# Patient Record
Sex: FEMALE | Race: WHITE | URBAN - METROPOLITAN AREA
[De-identification: names, ages, dates, MRNs, and addresses within clinical notes are randomized per-mention and may not be internally consistent; named-entity substitution may affect disease eponyms.]

---

## 2018-05-01 ENCOUNTER — OUTPATIENT (OUTPATIENT)
Dept: OUTPATIENT SERVICES | Facility: HOSPITAL | Age: 58
LOS: 1 days | End: 2018-05-01
Payer: MEDICAID

## 2018-05-01 PROCEDURE — G9001: CPT

## 2018-05-02 ENCOUNTER — TRANSCRIPTION ENCOUNTER (OUTPATIENT)
Age: 58
End: 2018-05-02

## 2018-05-02 PROBLEM — Z00.00 ENCOUNTER FOR PREVENTIVE HEALTH EXAMINATION: Status: ACTIVE | Noted: 2018-05-02

## 2018-05-10 ENCOUNTER — OUTPATIENT (OUTPATIENT)
Dept: OUTPATIENT SERVICES | Facility: HOSPITAL | Age: 58
LOS: 1 days | Discharge: HOME | End: 2018-05-10
Payer: MEDICAID

## 2018-05-10 DIAGNOSIS — C50.919 MALIGNANT NEOPLASM OF UNSPECIFIED SITE OF UNSPECIFIED FEMALE BREAST: ICD-10-CM

## 2018-05-14 ENCOUNTER — RESULT REVIEW (OUTPATIENT)
Age: 58
End: 2018-05-14

## 2018-05-14 ENCOUNTER — OUTPATIENT (OUTPATIENT)
Dept: OUTPATIENT SERVICES | Facility: HOSPITAL | Age: 58
LOS: 1 days | End: 2018-05-14
Payer: MEDICAID

## 2018-05-14 ENCOUNTER — APPOINTMENT (OUTPATIENT)
Dept: HEMATOLOGY ONCOLOGY | Facility: CLINIC | Age: 58
End: 2018-05-14

## 2018-05-14 VITALS
SYSTOLIC BLOOD PRESSURE: 116 MMHG | HEART RATE: 96 BPM | BODY MASS INDEX: 38.2 KG/M2 | HEIGHT: 66.46 IN | OXYGEN SATURATION: 99 % | RESPIRATION RATE: 16 BRPM | WEIGHT: 240.52 LBS | DIASTOLIC BLOOD PRESSURE: 78 MMHG | TEMPERATURE: 97.3 F

## 2018-05-14 DIAGNOSIS — Z80.3 FAMILY HISTORY OF MALIGNANT NEOPLASM OF BREAST: ICD-10-CM

## 2018-05-14 DIAGNOSIS — C50.919 MALIGNANT NEOPLASM OF UNSPECIFIED SITE OF UNSPECIFIED FEMALE BREAST: ICD-10-CM

## 2018-05-14 DIAGNOSIS — Z86.39 PERSONAL HISTORY OF OTHER ENDOCRINE, NUTRITIONAL AND METABOLIC DISEASE: ICD-10-CM

## 2018-05-14 DIAGNOSIS — Z81.1 FAMILY HISTORY OF ALCOHOL ABUSE AND DEPENDENCE: ICD-10-CM

## 2018-05-14 DIAGNOSIS — R00.0 TACHYCARDIA, UNSPECIFIED: ICD-10-CM

## 2018-05-14 DIAGNOSIS — Z80.42 FAMILY HISTORY OF MALIGNANT NEOPLASM OF PROSTATE: ICD-10-CM

## 2018-05-14 DIAGNOSIS — Z78.9 OTHER SPECIFIED HEALTH STATUS: ICD-10-CM

## 2018-05-14 LAB
HCT VFR BLD CALC: 34.2 % — LOW (ref 34.5–45)
HGB BLD-MCNC: 11.9 G/DL — SIGNIFICANT CHANGE UP (ref 11.5–15.5)
MCHC RBC-ENTMCNC: 33.5 PG — SIGNIFICANT CHANGE UP (ref 27–34)
MCHC RBC-ENTMCNC: 34.8 G/DL — SIGNIFICANT CHANGE UP (ref 32–36)
MCV RBC AUTO: 96.4 FL — SIGNIFICANT CHANGE UP (ref 80–100)
PLATELET # BLD AUTO: 150 K/UL — SIGNIFICANT CHANGE UP (ref 150–400)
RBC # BLD: 3.55 M/UL — LOW (ref 3.8–5.2)
RBC # FLD: 13.3 % — SIGNIFICANT CHANGE UP (ref 10.3–14.5)
WBC # BLD: 6.9 K/UL — SIGNIFICANT CHANGE UP (ref 3.8–10.5)
WBC # FLD AUTO: 6.9 K/UL — SIGNIFICANT CHANGE UP (ref 3.8–10.5)

## 2018-05-14 PROCEDURE — 86900 BLOOD TYPING SEROLOGIC ABO: CPT

## 2018-05-14 PROCEDURE — 86901 BLOOD TYPING SEROLOGIC RH(D): CPT

## 2018-05-14 PROCEDURE — 86850 RBC ANTIBODY SCREEN: CPT

## 2018-05-14 RX ORDER — LETROZOLE TABLETS 2.5 MG/1
2.5 TABLET, FILM COATED ORAL
Refills: 0 | Status: ACTIVE | COMMUNITY

## 2018-05-14 RX ORDER — OMEPRAZOLE MAGNESIUM 20 MG/1
20 TABLET, DELAYED RELEASE ORAL
Refills: 0 | Status: ACTIVE | COMMUNITY

## 2018-05-15 ENCOUNTER — TRANSCRIPTION ENCOUNTER (OUTPATIENT)
Age: 58
End: 2018-05-15

## 2018-05-15 ENCOUNTER — RESULT REVIEW (OUTPATIENT)
Age: 58
End: 2018-05-15

## 2018-05-15 PROCEDURE — 88321 CONSLTJ&REPRT SLD PREP ELSWR: CPT

## 2018-05-17 ENCOUNTER — FORM ENCOUNTER (OUTPATIENT)
Age: 58
End: 2018-05-17

## 2018-05-18 ENCOUNTER — OUTPATIENT (OUTPATIENT)
Dept: OUTPATIENT SERVICES | Facility: HOSPITAL | Age: 58
LOS: 1 days | End: 2018-05-18
Payer: MEDICAID

## 2018-05-18 ENCOUNTER — APPOINTMENT (OUTPATIENT)
Dept: CT IMAGING | Facility: CLINIC | Age: 58
End: 2018-05-18
Payer: MEDICAID

## 2018-05-18 DIAGNOSIS — Z00.8 ENCOUNTER FOR OTHER GENERAL EXAMINATION: ICD-10-CM

## 2018-05-18 PROCEDURE — 71250 CT THORAX DX C-: CPT | Mod: 26

## 2018-05-18 PROCEDURE — 74177 CT ABD & PELVIS W/CONTRAST: CPT | Mod: 26

## 2018-05-18 PROCEDURE — 74177 CT ABD & PELVIS W/CONTRAST: CPT

## 2018-05-18 PROCEDURE — 71250 CT THORAX DX C-: CPT

## 2018-05-21 PROBLEM — R00.0 TACHYCARDIA: Status: ACTIVE | Noted: 2018-05-21

## 2018-05-21 RX ORDER — METOPROLOL TARTRATE 50 MG/1
50 TABLET, FILM COATED ORAL DAILY
Qty: 30 | Refills: 0 | Status: ACTIVE | COMMUNITY
Start: 2018-05-21 | End: 1900-01-01

## 2018-05-21 RX ORDER — LETROZOLE TABLETS 2.5 MG/1
2.5 TABLET, FILM COATED ORAL DAILY
Qty: 30 | Refills: 5 | Status: ACTIVE | COMMUNITY
Start: 2018-05-21 | End: 1900-01-01

## 2018-05-23 ENCOUNTER — FORM ENCOUNTER (OUTPATIENT)
Age: 58
End: 2018-05-23

## 2018-05-24 ENCOUNTER — APPOINTMENT (OUTPATIENT)
Dept: NUCLEAR MEDICINE | Facility: IMAGING CENTER | Age: 58
End: 2018-05-24
Payer: MEDICAID

## 2018-05-24 ENCOUNTER — INPATIENT (INPATIENT)
Facility: HOSPITAL | Age: 58
LOS: 7 days | Discharge: HOSPICE | End: 2018-06-01
Attending: HOSPITALIST | Admitting: HOSPITALIST
Payer: MEDICAID

## 2018-05-24 ENCOUNTER — OUTPATIENT (OUTPATIENT)
Dept: OUTPATIENT SERVICES | Facility: HOSPITAL | Age: 58
LOS: 1 days | End: 2018-05-24
Payer: MEDICAID

## 2018-05-24 VITALS
OXYGEN SATURATION: 100 % | SYSTOLIC BLOOD PRESSURE: 145 MMHG | HEART RATE: 81 BPM | WEIGHT: 240.08 LBS | TEMPERATURE: 98 F | RESPIRATION RATE: 16 BRPM | HEIGHT: 66 IN | DIASTOLIC BLOOD PRESSURE: 64 MMHG

## 2018-05-24 DIAGNOSIS — R62.7 ADULT FAILURE TO THRIVE: ICD-10-CM

## 2018-05-24 DIAGNOSIS — C50.919 MALIGNANT NEOPLASM OF UNSPECIFIED SITE OF UNSPECIFIED FEMALE BREAST: ICD-10-CM

## 2018-05-24 LAB
ALBUMIN SERPL ELPH-MCNC: 2.8 G/DL — LOW (ref 3.3–5)
ALP SERPL-CCNC: 651 U/L — HIGH (ref 40–120)
ALT FLD-CCNC: 112 U/L — HIGH (ref 4–33)
APPEARANCE UR: SIGNIFICANT CHANGE UP
AST SERPL-CCNC: 394 U/L — HIGH (ref 4–32)
BASE EXCESS BLDV CALC-SCNC: -1.8 MMOL/L — SIGNIFICANT CHANGE UP
BASOPHILS # BLD AUTO: 0.05 K/UL — SIGNIFICANT CHANGE UP (ref 0–0.2)
BASOPHILS NFR BLD AUTO: 0.6 % — SIGNIFICANT CHANGE UP (ref 0–2)
BILIRUB SERPL-MCNC: 6.7 MG/DL — HIGH (ref 0.2–1.2)
BILIRUB UR-MCNC: HIGH
BLOOD GAS VENOUS - CREATININE: 0.99 MG/DL — SIGNIFICANT CHANGE UP (ref 0.5–1.3)
BLOOD UR QL VISUAL: NEGATIVE — SIGNIFICANT CHANGE UP
BUN SERPL-MCNC: 18 MG/DL — SIGNIFICANT CHANGE UP (ref 7–23)
CALCIUM SERPL-MCNC: 11.9 MG/DL — HIGH (ref 8.4–10.5)
CHLORIDE BLDV-SCNC: 106 MMOL/L — SIGNIFICANT CHANGE UP (ref 96–108)
CHLORIDE SERPL-SCNC: 98 MMOL/L — SIGNIFICANT CHANGE UP (ref 98–107)
CO2 SERPL-SCNC: 21 MMOL/L — LOW (ref 22–31)
COD CRY URNS QL: SIGNIFICANT CHANGE UP (ref 0–0)
COLOR SPEC: HIGH
CREAT SERPL-MCNC: 1 MG/DL — SIGNIFICANT CHANGE UP (ref 0.5–1.3)
EOSINOPHIL # BLD AUTO: 0.11 K/UL — SIGNIFICANT CHANGE UP (ref 0–0.5)
EOSINOPHIL NFR BLD AUTO: 1.3 % — SIGNIFICANT CHANGE UP (ref 0–6)
GAS PNL BLDV: 133 MMOL/L — LOW (ref 136–146)
GLUCOSE BLDV-MCNC: 110 — HIGH (ref 70–99)
GLUCOSE SERPL-MCNC: 113 MG/DL — HIGH (ref 70–99)
GLUCOSE UR-MCNC: NEGATIVE — SIGNIFICANT CHANGE UP
HCO3 BLDV-SCNC: 23 MMOL/L — SIGNIFICANT CHANGE UP (ref 20–27)
HCT VFR BLD CALC: 37.7 % — SIGNIFICANT CHANGE UP (ref 34.5–45)
HCT VFR BLDV CALC: 38.7 % — SIGNIFICANT CHANGE UP (ref 34.5–45)
HGB BLD-MCNC: 12.1 G/DL — SIGNIFICANT CHANGE UP (ref 11.5–15.5)
HGB BLDV-MCNC: 12.6 G/DL — SIGNIFICANT CHANGE UP (ref 11.5–15.5)
IMM GRANULOCYTES # BLD AUTO: 0.17 # — SIGNIFICANT CHANGE UP
IMM GRANULOCYTES NFR BLD AUTO: 2 % — HIGH (ref 0–1.5)
KETONES UR-MCNC: SIGNIFICANT CHANGE UP
LACTATE BLDV-MCNC: 4.3 MMOL/L — CRITICAL HIGH (ref 0.5–2)
LEUKOCYTE ESTERASE UR-ACNC: HIGH
LYMPHOCYTES # BLD AUTO: 2.1 K/UL — SIGNIFICANT CHANGE UP (ref 1–3.3)
LYMPHOCYTES # BLD AUTO: 24.6 % — SIGNIFICANT CHANGE UP (ref 13–44)
MCHC RBC-ENTMCNC: 30.9 PG — SIGNIFICANT CHANGE UP (ref 27–34)
MCHC RBC-ENTMCNC: 32.1 % — SIGNIFICANT CHANGE UP (ref 32–36)
MCV RBC AUTO: 96.4 FL — SIGNIFICANT CHANGE UP (ref 80–100)
MONOCYTES # BLD AUTO: 0.69 K/UL — SIGNIFICANT CHANGE UP (ref 0–0.9)
MONOCYTES NFR BLD AUTO: 8.1 % — SIGNIFICANT CHANGE UP (ref 2–14)
MUCOUS THREADS # UR AUTO: SIGNIFICANT CHANGE UP
NEUTROPHILS # BLD AUTO: 5.41 K/UL — SIGNIFICANT CHANGE UP (ref 1.8–7.4)
NEUTROPHILS NFR BLD AUTO: 63.4 % — SIGNIFICANT CHANGE UP (ref 43–77)
NITRITE UR-MCNC: NEGATIVE — SIGNIFICANT CHANGE UP
NON-SQ EPI CELLS # UR AUTO: 1 — SIGNIFICANT CHANGE UP
NRBC # FLD: 0.15 — SIGNIFICANT CHANGE UP
NRBC FLD-RTO: 1.8 — SIGNIFICANT CHANGE UP
PCO2 BLDV: 34 MMHG — LOW (ref 41–51)
PH BLDV: 7.42 PH — SIGNIFICANT CHANGE UP (ref 7.32–7.43)
PH UR: 6 — SIGNIFICANT CHANGE UP (ref 4.6–8)
PLATELET # BLD AUTO: 156 K/UL — SIGNIFICANT CHANGE UP (ref 150–400)
PMV BLD: 9.2 FL — SIGNIFICANT CHANGE UP (ref 7–13)
PO2 BLDV: 62 MMHG — HIGH (ref 35–40)
POTASSIUM BLDV-SCNC: 4.6 MMOL/L — HIGH (ref 3.4–4.5)
POTASSIUM SERPL-MCNC: 4.5 MMOL/L — SIGNIFICANT CHANGE UP (ref 3.5–5.3)
POTASSIUM SERPL-SCNC: 4.5 MMOL/L — SIGNIFICANT CHANGE UP (ref 3.5–5.3)
PROT SERPL-MCNC: 6.3 G/DL — SIGNIFICANT CHANGE UP (ref 6–8.3)
PROT UR-MCNC: 30 MG/DL — HIGH
RBC # BLD: 3.91 M/UL — SIGNIFICANT CHANGE UP (ref 3.8–5.2)
RBC # FLD: 16.2 % — HIGH (ref 10.3–14.5)
RBC CASTS # UR COMP ASSIST: SIGNIFICANT CHANGE UP (ref 0–?)
SAO2 % BLDV: 91.7 % — HIGH (ref 60–85)
SODIUM SERPL-SCNC: 135 MMOL/L — SIGNIFICANT CHANGE UP (ref 135–145)
SP GR SPEC: 1.03 — SIGNIFICANT CHANGE UP (ref 1–1.04)
SQUAMOUS # UR AUTO: SIGNIFICANT CHANGE UP
UROBILINOGEN FLD QL: 4 MG/DL — HIGH
WBC # BLD: 8.53 K/UL — SIGNIFICANT CHANGE UP (ref 3.8–10.5)
WBC # FLD AUTO: 8.53 K/UL — SIGNIFICANT CHANGE UP (ref 3.8–10.5)
WBC CLUMPS #/AREA URNS HPF: PRESENT — HIGH (ref 0–?)
WBC UR QL: SIGNIFICANT CHANGE UP (ref 0–?)

## 2018-05-24 PROCEDURE — 78306 BONE IMAGING WHOLE BODY: CPT | Mod: 26

## 2018-05-24 PROCEDURE — A9561: CPT

## 2018-05-24 PROCEDURE — 99223 1ST HOSP IP/OBS HIGH 75: CPT

## 2018-05-24 PROCEDURE — 78306 BONE IMAGING WHOLE BODY: CPT

## 2018-05-24 RX ORDER — SODIUM CHLORIDE 9 MG/ML
1000 INJECTION, SOLUTION INTRAVENOUS ONCE
Qty: 0 | Refills: 0 | Status: COMPLETED | OUTPATIENT
Start: 2018-05-24 | End: 2018-05-24

## 2018-05-24 RX ADMIN — SODIUM CHLORIDE 1000 MILLILITER(S): 9 INJECTION, SOLUTION INTRAVENOUS at 19:28

## 2018-05-24 RX ADMIN — SODIUM CHLORIDE 1000 MILLILITER(S): 9 INJECTION, SOLUTION INTRAVENOUS at 18:20

## 2018-05-24 NOTE — ED PROVIDER NOTE - ATTENDING CONTRIBUTION TO CARE
agree with resident note  56 yo female with PMH of metastatic breast cancer with mets to bones and now new findings on CT earlier today of mets to the liver.  Pt states sent by her oncologist for further workup.  Minimal PO tolerance over the last few weeks.  Denies fever    PE:  well appearing; MMM; CTAB/L; s1 s2 no m/r/g abd soft/NT/ND ext: no edema    admit for hydration and further workup biopsy, onc recs

## 2018-05-24 NOTE — H&P ADULT - PROBLEM SELECTOR PLAN 3
likely 2/2 dehydration, lower suspicion of metformin as a cause  - continue IVF hydration and recheck lactate

## 2018-05-24 NOTE — ED PROVIDER NOTE - PROGRESS NOTE DETAILS
Spoke with Dr. Campos. Oncology recs: admit for further workup including abdomen ultrasound and MRI, GI consult, liver biopsy with IR

## 2018-05-24 NOTE — H&P ADULT - NSHPPHYSICALEXAM_GEN_ALL_CORE
Vital Signs Last 24 Hrs  T(C): 36.7 (24 May 2018 22:50), Max: 37.1 (24 May 2018 19:35)  T(F): 98 (24 May 2018 22:50), Max: 98.7 (24 May 2018 19:35)  HR: 79 (24 May 2018 22:50) (77 - 81)  BP: 128/70 (24 May 2018 22:50) (128/70 - 145/64)  BP(mean): --  RR: 18 (24 May 2018 22:50) (16 - 18)  SpO2: 100% (24 May 2018 22:50) (98% - 100%)    PHYSICAL EXAM:  GENERAL: No Acute Distress, tired appearing  EYES: conjunctiva and sclera clear  ENMT: dry mucous membranes   NECK: Supple  CHEST/LUNG: Clear to auscultation bilaterally  HEART: Regular rate and rhythm; No murmurs, rubs, or gallops  ABDOMEN: Soft, Nontender, Nondistended; Bowel sounds normal  EXTREMITIES:   No clubbing, cyanosis, or pedal edema  PSYCH: Normal Affect, Normal Behavior  NEUROLOGY:   - Mental status A&O x 3,  SKIN: No rashes or lesions, dry  MUSCULOSKELETAL: No joint swelling

## 2018-05-24 NOTE — PATIENT PROFILE ADULT. - TRANSFUSION HX COMMENT, PROFILE
first ever blood transfusion pt states she had extreme hypertension, the second time it was elevated but not so drastically

## 2018-05-24 NOTE — H&P ADULT - HISTORY OF PRESENT ILLNESS
58 y/o F with DM, HTN, and breast ca (ER/SC + [id number] -) on letrozole with bone and liver mets s/p RT to hip 2016 p/w nausea, vomiting and weakness.  Pt recently moved from [place] where she had been established with an oncologist.  She was previously treated with Xgeva, and more recently with Ibrance which she stopped 2/2 adverse effects.  Pt established care at [facility] last week, and had imaging with bone scan and CT c/a/p which showed diffuse bone mets, and new diffuse liver mets.  Pt reports feeling nauseated with frequent vomiting for the past month, which has worsened over the past 2 weeks.  She has had minimal appetite.  She has been having decreased UOP with small amount of dark urine described as the color of "dark honey."  She went to urgent care 3 weeks ago 2/2 concern for UTI, but was told UA was negative.  She reports no fever or dysuria.  She has felt very weak.      2/2 pt's symptoms, imaging findings, and labs showing transaminitis with elevated INR, pt was told to come to the ED for further management.      In the ED, pt was given 2L of LR.

## 2018-05-24 NOTE — ED PROVIDER NOTE - CARE PLAN
Principal Discharge DX:	Failure to thrive in adult  Secondary Diagnosis:	Breast cancer  Secondary Diagnosis:	Cirrhosis

## 2018-05-24 NOTE — H&P ADULT - ASSESSMENT
56 y/o F with breast ca with bone and recently found liver mets, HTN, and DM p/w weakness and n/v with recent imaging showing new hepatic mets and possible cirrhosis.

## 2018-05-24 NOTE — ED ADULT TRIAGE NOTE - CHIEF COMPLAINT QUOTE
Pt c/o of constant nausea with vomiting since April pt was sent in by her PMD for further evaluation.  Pt has hx of breast cancer with mets to the bone on hormonal treatment for the cancer. Pt appears very pale in triage. Pt c/o of constant nausea with vomiting since April pt was sent in by her PMD for further evaluation.  Pt has hx of breast cancer with mets to the bone on hormonal treatment for the cancer. Pt appears very pale in triage.  on call oncologist  Dr. Campos [phone]

## 2018-05-24 NOTE — H&P ADULT - PROBLEM SELECTOR PLAN 7
IMPROVE VTE Individual Risk Assessment          RISK                                                          Points  [  ] Previous VTE                                                3  [  ] Thrombophilia                                             2  [  ] Lower limb paralysis                                   2        (unable to hold up >15 seconds)    [ x ] Current Cancer                                             2         (within 6 months)  [  ] Immobilization > 24 hrs                              1  [  ] ICU/CCU stay > 24 hours                             1  [  ] Age > 60                                                         1    IMPROVE VTE Score: 2    Will keep on SCD's for now given elevated INR Will keep on SCD's for now given elevated INR and unknown if brain mets present.    IMPROVE VTE Individual Risk Assessment          RISK                                                          Points  [  ] Previous VTE                                                3  [  ] Thrombophilia                                             2  [  ] Lower limb paralysis                                   2        (unable to hold up >15 seconds)    [ x ] Current Cancer                                             2         (within 6 months)  [  ] Immobilization > 24 hrs                              1  [  ] ICU/CCU stay > 24 hours                             1  [  ] Age > 60                                                         1    IMPROVE VTE Score: 2

## 2018-05-24 NOTE — H&P ADULT - NSHPREVIEWOFSYSTEMS_GEN_ALL_CORE
REVIEW OF SYSTEMS    General:  Generalized weakness  Skin/Breast:  Negative  Ophthalmologic:  Negative  ENMT:  Negative  Respiratory and Thorax:  Negative  Cardiovascular:  Negative  Gastrointestinal:  Nausea, vomiting.  No diarrhea or constipation  Genitourinary:  Dark urine as above  Musculoskeletal:  Negative  Neurological:  Negative  Psychiatric:  Negative  Hematology/Lymphatics:  Negative	  Endocrine:	  Negative  Allergic/Immunologic:	 Negative

## 2018-05-24 NOTE — ED ADULT NURSE NOTE - OBJECTIVE STATEMENT
Pt awake and alert co weakness and nausea. Pt looks pale in , denies pain at this time.  iv placed . Pts respirations non labored 02 sat 99% room air pt denies sob. Pt now awaiting md evaluation. Pt given call bell to ask for assistance, pts daughter at bedside.

## 2018-05-24 NOTE — PATIENT PROFILE ADULT. - MEDICATION ADMINISTRATION INFO, PROFILE
recently started having trouble having pills, smaller pills work better, pt states she vomits when she has trouble swallowing the pills/cut pills in half/difficulty swallowing pills

## 2018-05-24 NOTE — ED ADULT NURSE NOTE - CHIEF COMPLAINT QUOTE
Pt c/o of constant nausea with vomiting since April pt was sent in by her PMD for further evaluation.  Pt has hx of breast cancer with mets to the bone on hormonal treatment for the cancer. Pt appears very pale in triage.

## 2018-05-24 NOTE — H&P ADULT - NSHPLABSRESULTS_GEN_ALL_CORE
135  |  98  |  18  ----------------------------<  113<H>  4.5   |  21<L>  |  1.00    Ca    11.9<H>      24 May 2018 17:56    TPro  6.3  /  Alb  2.8<L>  /  TBili  6.7<H>  /  DBili  x   /  AST  394<H>  /  ALT  112<H>  /  AlkPhos  651<H>                              12.1   8.53  )-----------( 156      ( 24 May 2018 17:56 )             37.7                 Urinalysis Basic - ( 24 May 2018 23:22 )    Color: BROWN / Appearance: HAZY / S.029 / pH: 6.0  Gluc: NEGATIVE / Ketone: TRACE  / Bili: MODERATE / Urobili: 4 mg/dL   Blood: NEGATIVE / Protein: 30 mg/dL / Nitrite: NEGATIVE   Leuk Esterase: MODERATE / RBC: 2-5 / WBC 25-50   Sq Epi: OCC / Non Sq Epi: x / Bacteria: x    CT a/p reviewed in PACS: diffuse osseous and hepatic mets and possible cirrhosis      < from: [facility] Total (18 @ 16:37) >    Findings compatible with diffuse osseous metastases in the axial and   appendicular skeleton.    < end of copied text >

## 2018-05-24 NOTE — H&P ADULT - PROBLEM SELECTOR PLAN 1
- oncology outpatient notes reviewed  - will call IR in AM for possible biopsy to confirm breast primary

## 2018-05-24 NOTE — ED PROVIDER NOTE - OBJECTIVE STATEMENT
58 yo female with PMH of metastatic breast cancer with mets to bones and now new findings on CT earlier today of mets to the liver. Pt notes she has had chronic nausea and vomiting daily for at least 1-2 months. Sent in by her [facility] oncology team today for further evaluation. Pt is currently on hormonal treatment for her cancer. Pale appearing.   Patient's on call oncologist  Dr. Campos [phone]

## 2018-05-25 DIAGNOSIS — E11.9 TYPE 2 DIABETES MELLITUS WITHOUT COMPLICATIONS: ICD-10-CM

## 2018-05-25 DIAGNOSIS — C50.919 MALIGNANT NEOPLASM OF UNSPECIFIED SITE OF UNSPECIFIED FEMALE BREAST: ICD-10-CM

## 2018-05-25 DIAGNOSIS — Z29.9 ENCOUNTER FOR PROPHYLACTIC MEASURES, UNSPECIFIED: ICD-10-CM

## 2018-05-25 DIAGNOSIS — E83.52 HYPERCALCEMIA: ICD-10-CM

## 2018-05-25 DIAGNOSIS — K76.89 OTHER SPECIFIED DISEASES OF LIVER: ICD-10-CM

## 2018-05-25 DIAGNOSIS — E87.2 ACIDOSIS: ICD-10-CM

## 2018-05-25 DIAGNOSIS — I10 ESSENTIAL (PRIMARY) HYPERTENSION: ICD-10-CM

## 2018-05-25 DIAGNOSIS — C78.7 SECONDARY MALIGNANT NEOPLASM OF LIVER AND INTRAHEPATIC BILE DUCT: ICD-10-CM

## 2018-05-25 DIAGNOSIS — K72.90 HEPATIC FAILURE, UNSPECIFIED WITHOUT COMA: ICD-10-CM

## 2018-05-25 LAB
ALBUMIN SERPL ELPH-MCNC: 2.5 G/DL — LOW (ref 3.3–5)
ALP SERPL-CCNC: 554 U/L — HIGH (ref 40–120)
ALT FLD-CCNC: 102 U/L — HIGH (ref 4–33)
AST SERPL-CCNC: 349 U/L — HIGH (ref 4–32)
BILIRUB DIRECT SERPL-MCNC: 4.7 MG/DL — HIGH (ref 0.1–0.2)
BILIRUB SERPL-MCNC: 6.4 MG/DL — HIGH (ref 0.2–1.2)
BUN SERPL-MCNC: 18 MG/DL — SIGNIFICANT CHANGE UP (ref 7–23)
CALCIUM SERPL-MCNC: 11.1 MG/DL — HIGH (ref 8.4–10.5)
CHLORIDE SERPL-SCNC: 99 MMOL/L — SIGNIFICANT CHANGE UP (ref 98–107)
CO2 SERPL-SCNC: 23 MMOL/L — SIGNIFICANT CHANGE UP (ref 22–31)
CREAT SERPL-MCNC: 0.96 MG/DL — SIGNIFICANT CHANGE UP (ref 0.5–1.3)
GLUCOSE SERPL-MCNC: 85 MG/DL — SIGNIFICANT CHANGE UP (ref 70–99)
HCT VFR BLD CALC: 32.1 % — LOW (ref 34.5–45)
HGB BLD-MCNC: 10.4 G/DL — LOW (ref 11.5–15.5)
INR BLD: 1.47 — HIGH (ref 0.88–1.17)
LACTATE SERPL-SCNC: 2.7 MMOL/L — HIGH (ref 0.5–2)
MAGNESIUM SERPL-MCNC: 1.6 MG/DL — SIGNIFICANT CHANGE UP (ref 1.6–2.6)
MCHC RBC-ENTMCNC: 31.4 PG — SIGNIFICANT CHANGE UP (ref 27–34)
MCHC RBC-ENTMCNC: 32.4 % — SIGNIFICANT CHANGE UP (ref 32–36)
MCV RBC AUTO: 97 FL — SIGNIFICANT CHANGE UP (ref 80–100)
NRBC # FLD: 0.09 — SIGNIFICANT CHANGE UP
NRBC FLD-RTO: 1.2 — SIGNIFICANT CHANGE UP
PLATELET # BLD AUTO: 118 K/UL — LOW (ref 150–400)
PMV BLD: 9.4 FL — SIGNIFICANT CHANGE UP (ref 7–13)
POTASSIUM SERPL-MCNC: 4.6 MMOL/L — SIGNIFICANT CHANGE UP (ref 3.5–5.3)
POTASSIUM SERPL-SCNC: 4.6 MMOL/L — SIGNIFICANT CHANGE UP (ref 3.5–5.3)
PROT SERPL-MCNC: 5.5 G/DL — LOW (ref 6–8.3)
PROTHROM AB SERPL-ACNC: 16.4 SEC — HIGH (ref 9.8–13.1)
RBC # BLD: 3.31 M/UL — LOW (ref 3.8–5.2)
RBC # FLD: 16.9 % — HIGH (ref 10.3–14.5)
SODIUM SERPL-SCNC: 135 MMOL/L — SIGNIFICANT CHANGE UP (ref 135–145)
WBC # BLD: 7.8 K/UL — SIGNIFICANT CHANGE UP (ref 3.8–10.5)
WBC # FLD AUTO: 7.8 K/UL — SIGNIFICANT CHANGE UP (ref 3.8–10.5)

## 2018-05-25 PROCEDURE — 99222 1ST HOSP IP/OBS MODERATE 55: CPT | Mod: GC

## 2018-05-25 PROCEDURE — 99233 SBSQ HOSP IP/OBS HIGH 50: CPT

## 2018-05-25 RX ORDER — SODIUM CHLORIDE 9 MG/ML
1000 INJECTION INTRAMUSCULAR; INTRAVENOUS; SUBCUTANEOUS
Qty: 0 | Refills: 0 | Status: DISCONTINUED | OUTPATIENT
Start: 2018-05-25 | End: 2018-05-26

## 2018-05-25 RX ORDER — SODIUM CHLORIDE 9 MG/ML
1000 INJECTION, SOLUTION INTRAVENOUS
Qty: 0 | Refills: 0 | Status: DISCONTINUED | OUTPATIENT
Start: 2018-05-25 | End: 2018-06-01

## 2018-05-25 RX ORDER — INSULIN LISPRO 100/ML
VIAL (ML) SUBCUTANEOUS
Qty: 0 | Refills: 0 | Status: DISCONTINUED | OUTPATIENT
Start: 2018-05-25 | End: 2018-06-01

## 2018-05-25 RX ORDER — FLUTICASONE PROPIONATE 50 MCG
1 SPRAY, SUSPENSION NASAL
Qty: 0 | Refills: 0 | Status: DISCONTINUED | OUTPATIENT
Start: 2018-05-25 | End: 2018-06-01

## 2018-05-25 RX ORDER — CALCITONIN SALMON 200 [IU]/ML
400 INJECTION, SOLUTION INTRAMUSCULAR EVERY 12 HOURS
Qty: 0 | Refills: 0 | Status: COMPLETED | OUTPATIENT
Start: 2018-05-25 | End: 2018-05-26

## 2018-05-25 RX ORDER — DEXTROSE 50 % IN WATER 50 %
15 SYRINGE (ML) INTRAVENOUS ONCE
Qty: 0 | Refills: 0 | Status: DISCONTINUED | OUTPATIENT
Start: 2018-05-25 | End: 2018-06-01

## 2018-05-25 RX ORDER — ONDANSETRON 8 MG/1
4 TABLET, FILM COATED ORAL EVERY 8 HOURS
Qty: 0 | Refills: 0 | Status: DISCONTINUED | OUTPATIENT
Start: 2018-05-25 | End: 2018-05-30

## 2018-05-25 RX ORDER — DEXTROSE 50 % IN WATER 50 %
25 SYRINGE (ML) INTRAVENOUS ONCE
Qty: 0 | Refills: 0 | Status: DISCONTINUED | OUTPATIENT
Start: 2018-05-25 | End: 2018-06-01

## 2018-05-25 RX ORDER — METOPROLOL TARTRATE 50 MG
50 TABLET ORAL
Qty: 0 | Refills: 0 | Status: DISCONTINUED | OUTPATIENT
Start: 2018-05-25 | End: 2018-06-01

## 2018-05-25 RX ORDER — INSULIN LISPRO 100/ML
VIAL (ML) SUBCUTANEOUS AT BEDTIME
Qty: 0 | Refills: 0 | Status: DISCONTINUED | OUTPATIENT
Start: 2018-05-25 | End: 2018-06-01

## 2018-05-25 RX ORDER — ONDANSETRON 8 MG/1
4 TABLET, FILM COATED ORAL ONCE
Qty: 0 | Refills: 0 | Status: DISCONTINUED | OUTPATIENT
Start: 2018-05-25 | End: 2018-05-30

## 2018-05-25 RX ORDER — GLUCAGON INJECTION, SOLUTION 0.5 MG/.1ML
1 INJECTION, SOLUTION SUBCUTANEOUS ONCE
Qty: 0 | Refills: 0 | Status: DISCONTINUED | OUTPATIENT
Start: 2018-05-25 | End: 2018-06-01

## 2018-05-25 RX ORDER — SODIUM CHLORIDE 9 MG/ML
1000 INJECTION INTRAMUSCULAR; INTRAVENOUS; SUBCUTANEOUS
Qty: 0 | Refills: 0 | Status: DISCONTINUED | OUTPATIENT
Start: 2018-05-25 | End: 2018-05-25

## 2018-05-25 RX ORDER — PAMIDRONATE DISODIUM 9 MG/ML
90 INJECTION, SOLUTION INTRAVENOUS ONCE
Qty: 0 | Refills: 0 | Status: COMPLETED | OUTPATIENT
Start: 2018-05-25 | End: 2018-05-25

## 2018-05-25 RX ORDER — DEXTROSE 50 % IN WATER 50 %
12.5 SYRINGE (ML) INTRAVENOUS ONCE
Qty: 0 | Refills: 0 | Status: DISCONTINUED | OUTPATIENT
Start: 2018-05-25 | End: 2018-06-01

## 2018-05-25 RX ADMIN — CALCITONIN SALMON 400 INTERNATIONAL UNIT(S): 200 INJECTION, SOLUTION INTRAMUSCULAR at 18:30

## 2018-05-25 RX ADMIN — Medication 1 SPRAY(S): at 18:24

## 2018-05-25 RX ADMIN — Medication 50 MILLIGRAM(S): at 18:25

## 2018-05-25 RX ADMIN — ONDANSETRON 4 MILLIGRAM(S): 8 TABLET, FILM COATED ORAL at 18:59

## 2018-05-25 RX ADMIN — Medication 50 MILLIGRAM(S): at 06:29

## 2018-05-25 RX ADMIN — SODIUM CHLORIDE 125 MILLILITER(S): 9 INJECTION INTRAMUSCULAR; INTRAVENOUS; SUBCUTANEOUS at 10:33

## 2018-05-25 RX ADMIN — PAMIDRONATE DISODIUM 65 MILLIGRAM(S): 9 INJECTION, SOLUTION INTRAVENOUS at 18:25

## 2018-05-25 RX ADMIN — SODIUM CHLORIDE 100 MILLILITER(S): 9 INJECTION INTRAMUSCULAR; INTRAVENOUS; SUBCUTANEOUS at 01:17

## 2018-05-25 NOTE — CONSULT NOTE ADULT - SUBJECTIVE AND OBJECTIVE BOX
HEPATOLOGY       Chief Complaint:  Patient is a 57y old  Female who presents with a chief complaint of elevated liver enzymes, weakness, vomiting (24 May 2018 23:54)      HPI:  56 y/o F with DM, HTN, and breast ca (ER/IN + [id number] -) on letrozole with bone and liver mets s/p RT to hip 2016 p/w nausea, vomiting and weakness.  Pt recently moved from [place] where she had been established with an oncologist.  She was previously treated with Xgeva, and more recently with Ibrance which she stopped 2/2 adverse effects.  Pt established care at [facility] last week, and had imaging with bone scan and CT c/a/p which showed diffuse bone mets, and new diffuse liver mets.  Pt reports feeling nauseated with frequent vomiting for the past month, which has worsened over the past 2 weeks.  She has had minimal appetite.  She has been having decreased UOP with small amount of dark urine described as the color of "dark honey."  She went to urgent care 3 weeks ago 2/2 concern for UTI, but was told UA was negative.  She reports no fever or dysuria.  She has felt very weak.      2/2 pt's symptoms, imaging findings, and labs showing transaminitis with elevated INR, pt was told to come to the ED for further management.      In the ED, pt was given 2L of LR. (24 May 2018 23:54)      Allergies:  latex (Other (Skin))  No Known Allergies      Home Medications:  letrozole 2.5 mg oral tablet: 1 tab(s) orally once a day (25 May 2018 00:39)  metFORMIN:  (25 May 2018 00:39)  metoprolol tartrate 50 mg oral tablet: 1 tab(s) orally 2 times a day (25 May 2018 00:39)      Hospital Medications:  metoprolol tartrate 50 milliGRAM(s) Oral two times a day  sodium chloride 0.9%. 1000 milliLiter(s) IV Continuous <Continuous>      PMHX/PSHX:  Essential hypertension  Diabetes  Breast cancer  No significant past surgical history      Family history:  No pertinent family history in first degree relatives      Social History: no tob/etoh/drugs    ROS:     General:  No wt loss, fevers, chills, night sweats, fatigue,   Eyes:  Good vision, no reported pain  ENT:  No sore throat, pain, runny nose, dysphagia  CV:  No pain, palpitations, hypo/hypertension  Resp:  No dyspnea, cough, tachypnea, wheezing  GI:  See HPI  :  No pain, bleeding, incontinence, nocturia  Muscle:  No pain, weakness  Neuro:  No weakness, tingling, memory problems  Psych:  No fatigue, insomnia, mood problems, depression  Endocrine:  No polyuria, polydipsia, cold/heat intolerance  Heme:  No petechiae, ecchymosis, easy bruisability  Skin:  No rash, edema      PHYSICAL EXAM:     GENERAL:  Appears stated age, well-groomed, well-nourished, no distress  HEENT:  NC/AT,  conjunctivae clear and pink,  no JVD  CHEST:  Full & symmetric excursion, no increased effort, breath sounds clear  HEART:  Regular rhythm, S1, S2, no murmur/rub/S3/S4, no abdominal bruit, no edema  ABDOMEN:  Soft, non-tender, non-distended, normoactive bowel sounds,  no masses ,  EXTREMITIES:  no cyanosis,clubbing or edema  SKIN:  No rash/erythema/ecchymoses/petechiae/wounds/abscess/warm/dry  NEURO:  Alert, oriented    Vital Signs:  Vital Signs Last 24 Hrs  T(C): 36.8 (25 May 2018 05:28), Max: 37.1 (24 May 2018 19:35)  T(F): 98.3 (25 May 2018 05:28), Max: 98.7 (24 May 2018 19:35)  HR: 85 (25 May 2018 05:28) (77 - 85)  BP: 127/59 (25 May 2018 05:28) (127/59 - 145/64)  BP(mean): --  RR: 18 (25 May 2018 05:28) (16 - 18)  SpO2: 97% (25 May 2018 05:28) (97% - 100%)  Daily Height in cm: 167.64 (24 May 2018 17:15)    Daily     LABS:                        10.4   7.80  )-----------( 118      ( 25 May 2018 06:09 )             32.1         135  |  99  |  18  ----------------------------<  85  4.6   |  23  |  0.96    Ca    11.1<H>      25 May 2018 06:09  Mg     1.6         TPro  5.5<L>  /  Alb  2.5<L>  /  TBili  6.4<H>  /  DBili  4.7<H>  /  AST  349<H>  /  ALT  102<H>  /  AlkPhos  554<H>      LIVER FUNCTIONS - ( 25 May 2018 06:09 )  Alb: 2.5 g/dL / Pro: 5.5 g/dL / ALK PHOS: 554 u/L / ALT: 102 u/L / AST: 349 u/L / GGT: x           PT/INR - ( 25 May 2018 06:09 )   PT: 16.4 SEC;   INR: 1.47            Urinalysis Basic - ( 24 May 2018 23:22 )    Color: BROWN / Appearance: HAZY / S.029 / pH: 6.0  Gluc: NEGATIVE / Ketone: TRACE  / Bili: MODERATE / Urobili: 4 mg/dL   Blood: NEGATIVE / Protein: 30 mg/dL / Nitrite: NEGATIVE   Leuk Esterase: MODERATE / RBC: 2-5 / WBC 25-50   Sq Epi: OCC / Non Sq Epi: x / Bacteria: x          Imaging: HEPATOLOGY       Chief Complaint:  Patient is a 57y old  Female who presents with a chief complaint of elevated liver enzymes, weakness, vomiting (24 May 2018 23:54)      HPI:  56 y/o F with DM, HTN, and breast ca (ER/OH + Asb0izn -) on letrozole with bone and liver mets s/p RT to hip 2016 p/w nausea, vomiting and weakness.  Pt recently moved from [place] where she had been established with an oncologist.  She was previously treated with Xgeva, and more recently with Ibrance which she stopped 2/2 adverse effects.  Pt established care at [facility] last week, and had imaging with bone scan and CT c/a/p which showed diffuse bone mets, and new diffuse liver mets.  Pt reports feeling nauseated with frequent vomiting for the past month, which has worsened over the past 2 weeks.  She has had minimal appetite.  She has been having decreased UOP with small amount of dark urine described as the color of "dark honey."  She went to urgent care 3 weeks ago 2/2 concern for UTI, but was told UA was negative.  She reports no fever or dysuria.  She has felt very weak.      2/2 pt's symptoms, imaging findings, and labs showing transaminitis with elevated INR, pt was told to come to the ED for further management.      In the ED, pt was given 2L of LR. (24 May 2018 23:54)      Allergies:  latex (Other (Skin))  No Known Allergies      Home Medications:  letrozole 2.5 mg oral tablet: 1 tab(s) orally once a day (25 May 2018 00:39)  metFORMIN:  (25 May 2018 00:39)  metoprolol tartrate 50 mg oral tablet: 1 tab(s) orally 2 times a day (25 May 2018 00:39)      Hospital Medications:  metoprolol tartrate 50 milliGRAM(s) Oral two times a day  sodium chloride 0.9%. 1000 milliLiter(s) IV Continuous <Continuous>      PMHX/PSHX:  Essential hypertension  Diabetes  Breast cancer  No significant past surgical history      Family history:  No pertinent family history in first degree relatives      Social History: no tob/etoh/drugs    ROS:     General:  No wt loss, fevers, chills, night sweats, fatigue,   Eyes:  Good vision, no reported pain  ENT:  No sore throat, pain, runny nose, dysphagia  CV:  No pain, palpitations, hypo/hypertension  Resp:  No dyspnea, cough, tachypnea, wheezing  GI:  See HPI  :  No pain, bleeding, incontinence, nocturia  Muscle:  No pain, weakness  Neuro:  No weakness, tingling, memory problems  Psych:  No fatigue, insomnia, mood problems, depression  Endocrine:  No polyuria, polydipsia, cold/heat intolerance  Heme:  No petechiae, ecchymosis, easy bruisability  Skin:  No rash, edema      PHYSICAL EXAM:     GENERAL:  Appears stated age, well-groomed, well-nourished, no distress  HEENT:  NC/AT  HEART:  Regular rhythm, S1, S2, no murmur/rub/S3/S4  ABDOMEN:  Soft, non-tender, non-distended, normoactive bowel sounds, palpable liver   EXTREMITIES:  no cyanosis,clubbing or edema  SKIN:  No rash/erythema/ecchymoses/petechiae/wounds/abscess/warm/dry  NEURO:  Alert, oriented    Vital Signs:  Vital Signs Last 24 Hrs  T(C): 36.8 (25 May 2018 05:28), Max: 37.1 (24 May 2018 19:35)  T(F): 98.3 (25 May 2018 05:28), Max: 98.7 (24 May 2018 19:35)  HR: 85 (25 May 2018 05:28) (77 - 85)  BP: 127/59 (25 May 2018 05:28) (127/59 - 145/64)  BP(mean): --  RR: 18 (25 May 2018 05:28) (16 - 18)  SpO2: 97% (25 May 2018 05:28) (97% - 100%)  Daily Height in cm: 167.64 (24 May 2018 17:15)    Daily     LABS:                        10.4   7.80  )-----------( 118      ( 25 May 2018 06:09 )             32.1         135  |  99  |  18  ----------------------------<  85  4.6   |  23  |  0.96    Ca    11.1<H>      25 May 2018 06:09  Mg     1.6         TPro  5.5<L>  /  Alb  2.5<L>  /  TBili  6.4<H>  /  DBili  4.7<H>  /  AST  349<H>  /  ALT  102<H>  /  AlkPhos  554<H>  05-25    LIVER FUNCTIONS - ( 25 May 2018 06:09 )  Alb: 2.5 g/dL / Pro: 5.5 g/dL / ALK PHOS: 554 u/L / ALT: 102 u/L / AST: 349 u/L / GGT: x           PT/INR - ( 25 May 2018 06:09 )   PT: 16.4 SEC;   INR: 1.47            Urinalysis Basic - ( 24 May 2018 23:22 )    Color: BROWN / Appearance: HAZY / S.029 / pH: 6.0  Gluc: NEGATIVE / Ketone: TRACE  / Bili: MODERATE / Urobili: 4 mg/dL   Blood: NEGATIVE / Protein: 30 mg/dL / Nitrite: NEGATIVE   Leuk Esterase: MODERATE / RBC: 2-5 / WBC 25-50   Sq Epi: OCC / Non Sq Epi: x / Bacteria: x          Imaging:    CT from  reviewed with radiology

## 2018-05-25 NOTE — PROGRESS NOTE ADULT - PROBLEM SELECTOR PLAN 6
Will keep on SCD's for now given elevated INR     IMPROVE VTE Individual Risk Assessment          RISK                                                          Points  [  ] Previous VTE                                                3  [  ] Thrombophilia                                             2  [  ] Lower limb paralysis                                   2        (unable to hold up >15 seconds)    [ x ] Current Cancer                                             2         (within 6 months)  [  ] Immobilization > 24 hrs                              1  [  ] ICU/CCU stay > 24 hours                             1  [  ] Age > 60                                                         1    IMPROVE VTE Score: 2 Will keep on SCD's for now given elevated INR and anticipation of liver met biopsy     IMPROVE VTE Individual Risk Assessment          RISK                                                          Points  [  ] Previous VTE                                                3  [  ] Thrombophilia                                             2  [  ] Lower limb paralysis                                   2        (unable to hold up >15 seconds)    [ x ] Current Cancer                                             2         (within 6 months)  [  ] Immobilization > 24 hrs                              1  [  ] ICU/CCU stay > 24 hours                             1  [  ] Age > 60                                                         1    IMPROVE VTE Score: 2 - hold metformin   - Humalog sliding scale

## 2018-05-25 NOTE — CONSULT NOTE ADULT - PROBLEM SELECTOR RECOMMENDATION 9
- stage IV with known mets to bone, with new mets to the liver all measuring 1cm or less  - Recc liver biopsy by IR. Biopsy is necessary to determine the type of tumor and hormonal status.  Very unlikely for a hormone positive breast cancer to present with visceral crises in 10 days. Patient may be hormone receptor negative or may have progressed to Ina0asz +  -Patient is in visceral crises and will benefit from inpatient chemo, however this is problematic since patient has transaminitis and elevated bilirubin.   - Continue to monitor hepatic function panel. Will consider a lower dose of adriamycin inpatient if her bilirubin is less than 5.  - Continue to monitor tumor lysis labs and DIC labs BID: CMP, PT/PTT, fibrinogen - stage IV with known mets to bone, with new mets to the liver all measuring 1cm or less  - [facility] liver biopsy by IR: scheduled for Tuesday, Biopsy is necessary to determine the type of tumor and hormonal status.  Very unlikely for a hormone positive breast cancer to present with visceral crises in 10 days. Patient may be hormone receptor negative or may have progressed to [id number] +  -Patient is in visceral crises and will benefit from inpatient chemo, however this is problematic since patient has transaminitis and elevated bilirubin.   - Continue to monitor hepatic function panel. Will consider a lower dose of adriamycin inpatient if her bilirubin is less than 5.  - Continue to monitor tumor lysis labs and DIC labs BID: CMP, PT/PTT, fibrinogen - stage IV with known mets to bone, with new mets to the liver all measuring 1cm or less  - [facility] liver biopsy by IR: scheduled for Tuesday, Biopsy is necessary to determine the type of tumor and hormonal status.  Very unlikely for a hormone positive breast cancer to present with visceral crises in 10 days. Patient may be hormone receptor negative or may have progressed to Ryh5hix +  -Patient is in visceral crises and will benefit from inpatient chemo, however this is problematic since patient has transaminitis and elevated bilirubin.   - Continue to monitor hepatic function panel. Will consider a lower dose of adriamycin inpatient if her bilirubin is less than 5.  - Continue to monitor DIC labs daily: CBC, CMP, PT/PTT, fibrinogen,

## 2018-05-25 NOTE — PROGRESS NOTE ADULT - PROBLEM SELECTOR PLAN 7
Will keep on SCD's for now given elevated INR and anticipation of liver met biopsy     IMPROVE VTE Individual Risk Assessment          RISK                                                          Points  [  ] Previous VTE                                                3  [  ] Thrombophilia                                             2  [  ] Lower limb paralysis                                   2        (unable to hold up >15 seconds)    [ x ] Current Cancer                                             2         (within 6 months)  [  ] Immobilization > 24 hrs                              1  [  ] ICU/CCU stay > 24 hours                             1  [  ] Age > 60                                                         1    IMPROVE VTE Score: 2

## 2018-05-25 NOTE — PROGRESS NOTE ADULT - PROBLEM SELECTOR PLAN 4
- continue metoprolol - Appreciate onc recs  - Will resume IVF for now   - Ordered for calcitonin and Pamidronate, check daily BMP

## 2018-05-25 NOTE — PROGRESS NOTE ADULT - PROBLEM SELECTOR PLAN 1
- Onc and GI note reviewed  - IR called for biopsy of liver met, awaiting attending review of imaging, will likely be scheduled for next week  - GI recommending MRCP to assess for obstructive process, if positive, may need possible stenting to relieve obstruction. Will hold for now, if transaminases continue to rise or pt becomes increasingly symptomatic (i.e. worsening abd pain/pruritus) will order MRCP. - Onc and GI note reviewed  - IR called for biopsy of liver met, planned for Tuesday   - GI recommending MRCP to assess for obstructive process, if positive, may need possible stenting to relieve obstruction. MRCP ordered.

## 2018-05-25 NOTE — CONSULT NOTE ADULT - ASSESSMENT
56 y/o F with DM, HTN, and breast ca (ER/[place] + [id number] -) on letrozole with bone mets s/p RT to hip 2016 found to have transaminitis, elevated total bilirubin and new mets to the liver.   - Agree with liver biopsy by IR   Further recs to come  Will discuss with attending, Dr. Dunn 58 y/o F with DM, HTN, and breast ca (ER/[place] + Bug2iws -) on letrozole with bone mets s/p RT to hip 2016 found to have transaminitis, elevated total bilirubin and new liver lesions. Also found to have cirrhosis on CT concerning for visceral crisis. Liver biopsy is necessary to determine the hormonal receptor status.   - Agree with liver biopsy by IR. Patient may be hormone receptor negative or may have progressed to Pnt5ksv +  - Consider MRCP to determine if there is an obstruction causing elevated bili and alk phos  - cirrhosis workup per GI  Further recs to come  Will discuss with attending, Dr. Dunn 58 y/o F with DM, HTN, and breast ca (ER/[place] + Cez9gqi -) was on letrozole with bone mets s/p RT to hip 2016 found to have transaminitis, elevated total bilirubin and new liver lesions. Also found to have cirrhosis on CT concerning for visceral crisis. Liver biopsy is necessary to determine the hormonal receptor status.   - Recc liver biopsy by IR. Biopsy is necessary to determine the type of tumor and hormonal status.  Very unlikely for a hormone positive breast cancer to present with visceral crises in 10 days. Patient may be hormone receptor negative or may have progressed to [id number] +  - GI consult -Consider MRCP to determine if there is an obstruction causing elevated bili and alk phos.   -Patient is in visceral crises and will need inpatient chemo, once her bilirubin is normal. 58 y/o F with DM, HTN, and breast ca (ER/TN + Qqf4etc -) was on letrozole with bone mets s/p RT to hip 2016 found to have transaminitis, elevated total bilirubin and new liver lesions. Also found to have cirrhosis on CT concerning for visceral crisis. Liver biopsy is necessary to determine the hormonal receptor status.

## 2018-05-25 NOTE — CONSULT NOTE ADULT - ATTENDING COMMENTS
Patient with known metastatic breast cancer who stopped chemotherapy in feb/March 2018 and now presents with a liver with multiple nodules.  She reports a CT scan in 2016 did not show any live rabnormality.  The patient has marked liver test abnormalities and liver is enlarged and nodular on exam.  These findings are most consistent with metastatic cancer in liver.  The nodularity of liver may be due to cancer rather than cirrhosis.  Oncology will recommend if tissue diagnosis of liver metastases is an important determinant for treatment and if so liver biopsy may be performed.    Patient advised to obtain prior CT images for review.
hormone positive metastatic breast cancer on 5/18 CT abdomen showed diffuse liver mets with normal CBD, bone mets. His abnormal LFT including total bilirubin 6.4 is due to diffuse liver mets. Given recent CT abdomen it is less likely to show any obstruction which can be placed stent. Will follow up MRCP result and LFT. If her LFT is worsening she is not a candidate for any chemotherapy.

## 2018-05-25 NOTE — PROGRESS NOTE ADULT - ASSESSMENT
56 y/o F with stage IV breast ca with mets to bone, HTN, and DM p/w weakness and n/v with recent imaging showing new hepatic mets and acute hepatitis likely from infiltrative malignant process.

## 2018-05-25 NOTE — CONSULT NOTE ADULT - ASSESSMENT
Impression:  58yo F with DM, HTN, and breast ca (ER/[place] + Ofk7mds -) on letrozole with bone and liver mets s/p RT to hip 2016 p/w nausea, vomiting and weakness  Found to have multiple liver lesions, likely metastatic disease, for which hepatology was consulted.    Problem List:  1) Diffuse liver lesions likely metastatic breast ca with abnormal liver tests likely 2/2 infiltrative disease  2) Metastastic breast ca to bones, likely liver as above  3) HTN  4) DM  5) PO intolerance    Plan:  - spoke with oncology, the reason for liver bx request is to eval for conversion to triple negative disease - biopsy will direct oncologic treatment plan.  Thus, agree with obtaining liver bx, will need FFP for elevated INR  - advised patient's family to obtain 2016 CT from [place] and fax to [facility] for comparison  - in terms of elevated bilirubin, likely 2/2 infiltration from metastatic disease, no intrahepatic biliary ductal dilatation noted on CT.  Can obtain MRCP to see if there is dilatation/possible role for stenting but not likely to be present; if so, would reach out to advanced endoscopy team   - for nausea/vomiting with PO, consider upper GI series to assess for outflow obstruction  - trend CMP, INR, CBC daily Impression:  58yo F with DM, HTN, and breast ca (ER/[place] + Myy6fvc -) on letrozole with bone and liver mets s/p RT to hip 2016 p/w nausea, vomiting and weakness  Found to have multiple liver lesions, likely metastatic disease, for which hepatology was consulted.    Problem List:  1) Diffuse liver lesions likely metastatic breast ca with abnormal liver tests likely 2/2 infiltrative disease.  Nodular morphology of liver on CT/hard smooth texture on exam is likely from metastatic lesions, less likely new cirrhosis  2) Metastastic breast ca to bones, likely liver as above  3) HTN  4) DM  5) PO intolerance    Plan:  - spoke with oncology, the reason for liver bx request is to eval for conversion to triple negative disease - biopsy will direct oncologic treatment plan.  Thus, agree with obtaining liver bx, will need FFP for elevated INR  - advised patient's family to obtain 2016 CT from [place] and fax to [facility] for comparison  - in terms of elevated bilirubin, likely 2/2 infiltration from metastatic disease, no intrahepatic biliary ductal dilatation noted on CT.  Can obtain MRCP to see if there is dilatation/possible role for stenting but not likely to be present; if so, would reach out to advanced endoscopy team   - for nausea/vomiting with PO, consider upper GI series to assess for outflow obstruction  - trend CMP, INR, CBC daily

## 2018-05-25 NOTE — CONSULT NOTE ADULT - SUBJECTIVE AND OBJECTIVE BOX
CHIEF COMPLAINT:    HPI:56 y/o F with DM, HTN, and breast ca (ER/AR + Dve6psu -) on letrozole with bone mets s/p RT to hip 2016 p/w nausea, vomiting and weakness.  Pt recently moved from [place] where she had been established with an oncologist.  She was previously treated with Xgeva, and more recently with Ibrance which she stopped 2/2 adverse effects.  Pt established care at [facility] last week, and had imaging with bone scan and CT c/a/p which showed diffuse bone mets, and new diffuse liver mets.  Pt reports feeling nauseated with frequent vomiting for the past month, which has worsened over the past 2 weeks.  She has had minimal appetite.  She has been having decreased UOP with small amount of dark urine described as the color of "dark honey."  She went to urgent care 3 weeks ago 2/2 concern for UTI, but was told UA was negative.  She reports no fever or dysuria.  She has felt very weak.      PAST MEDICAL & SURGICAL HISTORY:  Essential hypertension  Diabetes  Breast cancer  No significant past surgical history      FAMILY HISTORY:  No pertinent family history in first degree relatives      SOCIAL HISTORY:  Smoking: __ packs x ___ years  EtOH Use:  Marital Status:  Occupation:  Recent Travel:  Country of Birth:  Advance Directives:    Allergies    No Known Allergies    Intolerances    latex (Other (Skin))      HOME MEDICATIONS:    REVIEW OF SYSTEMS:  Constitutional:   Eyes:  ENT:  CV:  Resp:  GI:  :  MSK:  Integumentary:  Neurological:  Psychiatric:  Endocrine:  Hematologic/Lymphatic:  Allergic/Immunologic:  [ ] All other systems negative  [ ] Unable to assess ROS because ________    OBJECTIVE:  Vital Signs Last 24 Hrs  T(C): 36.8 (25 May 2018 05:28), Max: 37.1 (24 May 2018 19:35)  T(F): 98.3 (25 May 2018 05:28), Max: 98.7 (24 May 2018 19:35)  HR: 85 (25 May 2018 05:28) (77 - 85)  BP: 127/59 (25 May 2018 05:28) (127/59 - 145/64)  BP(mean): --  ABP: --  ABP(mean): --  RR: 18 (25 May 2018 05:28) (16 - 18)  SpO2: 97% (25 May 2018 05:28) (97% - 100%)        CAPILLARY BLOOD GLUCOSE      POCT Blood Glucose.: 107 mg/dL (24 May 2018 17:20)      PHYSICAL EXAM:  General:   HEENT:   Lymph Nodes:  Neck:   Respiratory:   Cardiovascular:   Abdomen:   Extremities:   Skin:   Neurological:  Psychiatry:    HOSPITAL MEDICATIONS:  MEDICATIONS  (STANDING):  metoprolol tartrate 50 milliGRAM(s) Oral two times a day  sodium chloride 0.9%. 1000 milliLiter(s) (125 mL/Hr) IV Continuous <Continuous>    MEDICATIONS  (PRN):      LABS:                        10.4   7.80  )-----------( 118      ( 25 May 2018 06:09 )             32.1     05-    135  |  99  |  18  ----------------------------<  85  4.6   |  23  |  0.96    Ca    11.1<H>      25 May 2018 06:09  Mg     1.6         TPro  5.5<L>  /  Alb  2.5<L>  /  TBili  6.4<H>  /  DBili  4.7<H>  /  AST  349<H>  /  ALT  102<H>  /  AlkPhos  554<H>      PT/INR - ( 25 May 2018 06:09 )   PT: 16.4 SEC;   INR: 1.47            Urinalysis Basic - ( 24 May 2018 23:22 )    Color: BROWN / Appearance: HAZY / S.029 / pH: 6.0  Gluc: NEGATIVE / Ketone: TRACE  / Bili: MODERATE / Urobili: 4 mg/dL   Blood: NEGATIVE / Protein: 30 mg/dL / Nitrite: NEGATIVE   Leuk Esterase: MODERATE / RBC: 2-5 / WBC 25-50   Sq Epi: OCC / Non Sq Epi: x / Bacteria: x        Venous Blood Gas:   @ 17:56  7.42/34/62/23/91.7  VBG Lactate: 4.3      MICROBIOLOGY:     RADIOLOGY:  Copied from last [employer] oncology note:   Surgical pathology report [facility] [address], [place]   10/2016  Ultrasound guided core biopsy   Specimen size: Greatest dim  Right breast: Invasive mammary carcinoma with ductal and lobular features.  Right axilla: Metastatic carcinoma present  specimen size greatest dimension: cannot be determined  Histologic type of invasive carcinoma: invasive mammary carcinoma with ductal and lobular features   Tumor size, size of largest invasive carcinoma: greatest dimension of largest focus of invasion over 0.1cm: 1.2cm  Histologic grade: [place] histologic score:  Glandular differentiation: 3  Nuclear pleomorphism: 3  Mitotic count: 1  Overall stgstrstastdstest:st st1st Ductal carcinoma in situ: No DCIS present  Lobular carcinoma in situ: not identified  Lymph-vascular invasion: not identified   Dermal lymph vascular invasion: no skin present  microcalcifications: not present  ancillary studies:   Estrogen receptor: 90% strong positive  Progesterone receptor: 70% strong positive  Her 2: 1+ negative.  Ki67: greater than 20%    FISH for Her2/negin: not performed      10/14/2016 [place]:   Nuclear medicine bone scan: there are multiple areas of abnormal increased activity consistent with osseous metastatic disease. There is abnormal uptake involving the axial and appendicular skeleton with foci of uptake involving the shoulder girdles, humeri, sternum, rib cage, thoracic spine, lumbar spine, sacrum, bony pelvis, bilateral femurs and bilateral tibias. There is asymmetric uptake involving the right hemicalvarium , probably in the region of right parietal bone and possibly right frontal bone, worrisome for calvarial metastatic focus. There is bandlike area of uptake in region of right femoral neck.      10/2016 CT Scans Chest/Abdomen  Lungs and airways: Small b/l pleural effusions, left > right  Multiple small 2 to 3 mm lung nodules best seen on the reconstruction images. Metastasis not entirely excluded. The largest nodule is contiguous with the right major fissure measuring up to 6mm.  Vessels: WNL  Heart: normal size. no pericardial effusion  Mediastinum and sammi: WNL  Chest and lower neck: heterogenous thyroid gland. multiple normal size and enlarged right axillary lymph nodes with adjacent stranding, concerning for metastatic adenopathy. The largest right axillary lymph node measured 2.8 x 1.7cm. Skin thickening of the right breast.    Abdomen:  Liver: WNL  Bile ducts: normal  Gallbladder: multiple calcified and partly calcified gallstone. fluid within the gallbladder is hyperdense, which may represent hyperdense sludge versus vicarious excretion of contrast.  Pancreas: WNL  Spleen: WNL  Adrenals: the right adrenal gland is WNL. mild stranding of the left adrenal gland with irregular nodular thickening without definite discrete measurable nodule.   Kidneys: WNL  Bowel: normal caliber  Peritoneum: no ascites   Vessels: atherosclerotic changes wnl.  retroperitoneum: no enlarged retroperitoneal or pelvic nodes. within normal limits.  Abdominal wall: probable hematoma in the right lateral flank area near the postoperative skin staples.  Bones: extensive sclerotic metastasis throughout the bilateral ribs, sternum, spine, scapula, pelvis keeping with extensive metastasis.   Impression: Extensive widespread osteoblastic metastasis. Multiple lung nodules. Metastasis is not entirely excluded. Small b/l pleural effusions. Irregular nodular thickening of the left adrenal gland with fat stranding. Attention on follow up study as metastasis is not entirely excluded. Right axillary adenopathy, concerning for metastatic adenopathy.     [ ] Reviewed and interpreted by me    EKG: CHIEF COMPLAINT: nausea/vomiting, weakness     HPI:58 y/o F with DM, HTN, and breast ca (ER/[place] + [id number] -) on letrozole with bone mets s/p RT to hip 2016 p/w nausea, vomiting and weakness.  Pt recently moved from [place] where she had been established with an oncologist.  She was previously treated with Xgeva, and more recently with Ibrance which she stopped 2/2 adverse effects.  Pt established care at [facility] last week, and had imaging with bone scan and CT c/a/p which showed diffuse bone mets, and new diffuse liver mets.  Pt reports feeling nauseated with frequent vomiting for the past month, which has worsened over the past 2 weeks.  She has had minimal appetite.      PAST MEDICAL & SURGICAL HISTORY:  Essential hypertension  Diabetes  Breast cancer  No significant past surgical history      FAMILY HISTORY:  No pertinent family history in first degree relatives      SOCIAL HISTORY:  Smoking: nonsmoker  EtOH Use: denies  Marital Status:   Occupation:  Recent Travel:  Country of Birth:  Advance Directives:    Allergies    No Known Allergies    Intolerances    latex (Other (Skin))      HOME MEDICATIONS:  letrozole 2.5 mg oral tablet: 1 tab(s) orally once a day (25 May 2018 00:39)  metFORMIN:  (25 May 2018 00:39)  metoprolol tartrate 50 mg oral tablet: 1 tab(s) orally 2 times a day (25 May 2018 00:39)    REVIEW OF SYSTEMS:    CONSTITUTIONAL: WEAKNESS, no fever/chills  EYES/ENT: No visual changes;  No vertigo or throat pain   NECK: No pain or stiffness  RESPIRATORY: No cough, wheezing, hemoptysis; No shortness of breath  CARDIOVASCULAR: No chest pain or palpitations  GASTROINTESTINAL: NAUSEA/VOMITING; No abdominal or epigastric pain; No diarrhea or constipation. No melena or hematochezia.  GENITOURINARY: No dysuria, frequency or hematuria  NEUROLOGICAL: No numbness or weakness  SKIN: No itching, burning, rashes, or lesions   All other review of systems is negative unless indicated above.    OBJECTIVE:  Vital Signs Last 24 Hrs  T(C): 36.8 (25 May 2018 05:28), Max: 37.1 (24 May 2018 19:35)  T(F): 98.3 (25 May 2018 05:28), Max: 98.7 (24 May 2018 19:35)  HR: 85 (25 May 2018 05:28) (77 - 85)  BP: 127/59 (25 May 2018 05:28) (127/59 - 145/64)  RR: 18 (25 May 2018 05:28) (16 - 18)  SpO2: 97% (25 May 2018 05:28) (97% - 100%)        CAPILLARY BLOOD GLUCOSE      POCT Blood Glucose.: 107 mg/dL (24 May 2018 17:20)    PHYSICAL EXAM:  GENERAL: NAD, well-developed  HEAD:  Atraumatic, Normocephalic  EYES: scleral icterus, EOMI  NECK: Supple, No JVD  CHEST/LUNG: Clear to auscultation bilaterally; No wheeze  HEART: Regular rate and rhythm; No murmurs, rubs, or gallops  ABDOMEN: Soft, Nontender, Nondistended; Bowel sounds present  EXTREMITIES:  2+ Peripheral Pulses, No clubbing, cyanosis, or edema  PSYCH: AAOx3  NEUROLOGY: non-focal  SKIN: No rashes or lesions    HOSPITAL MEDICATIONS:  MEDICATIONS  (STANDING):  metoprolol tartrate 50 milliGRAM(s) Oral two times a day  sodium chloride 0.9%. 1000 milliLiter(s) (125 mL/Hr) IV Continuous <Continuous>    MEDICATIONS  (PRN):      LABS:                        10.4   7.80  )-----------( 118      ( 25 May 2018 06:09 )             32.1         135  |  99  |  18  ----------------------------<  85  4.6   |  23  |  0.96    Ca    11.1<H>      25 May 2018 06:09  Mg     1.6         TPro  5.5<L>  /  Alb  2.5<L>  /  TBili  6.4<H>  /  DBili  4.7<H>  /  AST  349<H>  /  ALT  102<H>  /  AlkPhos  554<H>  25    PT/INR - ( 25 May 2018 06:09 )   PT: 16.4 SEC;   INR: 1.47            Urinalysis Basic - ( 24 May 2018 23:22 )    Color: BROWN / Appearance: HAZY / S.029 / pH: 6.0  Gluc: NEGATIVE / Ketone: TRACE  / Bili: MODERATE / Urobili: 4 mg/dL   Blood: NEGATIVE / Protein: 30 mg/dL / Nitrite: NEGATIVE   Leuk Esterase: MODERATE / RBC: 2-5 / WBC 25-50   Sq Epi: OCC / Non Sq Epi: x / Bacteria: x        Venous Blood Gas:   @ 17:56  7.42/34/62/23/91.7  VBG Lactate: 4.3      MICROBIOLOGY:     RADIOLOGY:  Copied from last [employer] oncology note:   Surgical pathology report [facility] [address], [place]   10/2016  Ultrasound guided core biopsy   Specimen size: Greatest dim  Right breast: Invasive mammary carcinoma with ductal and lobular features.  Right axilla: Metastatic carcinoma present  specimen size greatest dimension: cannot be determined  Histologic type of invasive carcinoma: invasive mammary carcinoma with ductal and lobular features   Tumor size, size of largest invasive carcinoma: greatest dimension of largest focus of invasion over 0.1cm: 1.2cm  Histologic grade: [place] histologic score:  Glandular differentiation: 3  Nuclear pleomorphism: 3  Mitotic count: 1  Overall stgstrstastdstest:st st1st Ductal carcinoma in situ: No DCIS present  Lobular carcinoma in situ: not identified  Lymph-vascular invasion: not identified   Dermal lymph vascular invasion: no skin present  microcalcifications: not present  ancillary studies:   Estrogen receptor: 90% strong positive  Progesterone receptor: 70% strong positive  Her 2: 1+ negative.  Ki67: greater than 20%    FISH for Her2/negin: not performed      10/14/2016 [place]:   Nuclear medicine bone scan: there are multiple areas of abnormal increased activity consistent with osseous metastatic disease. There is abnormal uptake involving the axial and appendicular skeleton with foci of uptake involving the shoulder girdles, humeri, sternum, rib cage, thoracic spine, lumbar spine, sacrum, bony pelvis, bilateral femurs and bilateral tibias. There is asymmetric uptake involving the right hemicalvarium , probably in the region of right parietal bone and possibly right frontal bone, worrisome for calvarial metastatic focus. There is bandlike area of uptake in region of right femoral neck.      10/2016 CT Scans Chest/Abdomen  Lungs and airways: Small b/l pleural effusions, left > right  Multiple small 2 to 3 mm lung nodules best seen on the reconstruction images. Metastasis not entirely excluded. The largest nodule is contiguous with the right major fissure measuring up to 6mm.  Vessels: WNL  Heart: normal size. no pericardial effusion  Mediastinum and sammi: WNL  Chest and lower neck: heterogenous thyroid gland. multiple normal size and enlarged right axillary lymph nodes with adjacent stranding, concerning for metastatic adenopathy. The largest right axillary lymph node measured 2.8 x 1.7cm. Skin thickening of the right breast.    Abdomen:  Liver: WNL  Bile ducts: normal  Gallbladder: multiple calcified and partly calcified gallstone. fluid within the gallbladder is hyperdense, which may represent hyperdense sludge versus vicarious excretion of contrast.  Pancreas: WNL  Spleen: WNL  Adrenals: the right adrenal gland is WNL. mild stranding of the left adrenal gland with irregular nodular thickening without definite discrete measurable nodule.   Kidneys: WNL  Bowel: normal caliber  Peritoneum: no ascites   Vessels: atherosclerotic changes wnl.  retroperitoneum: no enlarged retroperitoneal or pelvic nodes. within normal limits.  Abdominal wall: probable hematoma in the right lateral flank area near the postoperative skin staples.  Bones: extensive sclerotic metastasis throughout the bilateral ribs, sternum, spine, scapula, pelvis keeping with extensive metastasis.   Impression: Extensive widespread osteoblastic metastasis. Multiple lung nodules. Metastasis is not entirely excluded. Small b/l pleural effusions. Irregular nodular thickening of the left adrenal gland with fat stranding. Attention on follow up study as metastasis is not entirely excluded. Right axillary adenopathy, concerning for metastatic adenopathy.     [ ] Reviewed and interpreted by me    EKG:

## 2018-05-25 NOTE — PROGRESS NOTE ADULT - SUBJECTIVE AND OBJECTIVE BOX
Patient is a 57y old  Female who presents with a chief complaint of elevated liver enzymes, weakness, vomiting (24 May 2018 23:54)      SUBJECTIVE / OVERNIGHT EVENTS: Pt and family increasingly frustrated that nothing is being done today.  Currently pt doesn't feel nauseous because she hasn't eaten anything since yesterday.  Denies abd pain at rest, but when palpated, has significant RUQ pain.  Admits to dark urine, last BM yesterday, denies melena/change in color or consistency.  Notes yellowing of skin since yesterday.       MEDICATIONS  (STANDING):  metoprolol tartrate 50 milliGRAM(s) Oral two times a day  sodium chloride 0.9%. 1000 milliLiter(s) (125 mL/Hr) IV Continuous <Continuous>    MEDICATIONS  (PRN):      Vital Signs Last 24 Hrs  T(C): 36.8 (25 May 2018 05:28), Max: 37.1 (24 May 2018 19:35)  T(F): 98.3 (25 May 2018 05:28), Max: 98.7 (24 May 2018 19:35)  HR: 85 (25 May 2018 05:28) (77 - 85)  BP: 127/59 (25 May 2018 05:28) (127/59 - 145/64)  BP(mean): --  RR: 18 (25 May 2018 05:28) (16 - 18)  SpO2: 97% (25 May 2018 05:28) (97% - 100%)  CAPILLARY BLOOD GLUCOSE      POCT Blood Glucose.: 107 mg/dL (24 May 2018 17:20)    I&O's Summary      PHYSICAL EXAM  GENERAL: NAD, well-developed  EYES: EOMI, PERRLA, +scleral icterus   NECK: Supple, No JVD  CHEST/LUNG: Clear to auscultation bilaterally; No wheeze  HEART: Regular rate and rhythm; No murmurs, rubs, or gallops  ABDOMEN: Soft, +RUQ tenderness, +bowel sounds x4  EXTREMITIES:  2+ Peripheral Pulses, No clubbing, cyanosis, or edema  PSYCH: AAOx3  SKIN: jaundiced     LABS:                        10.4   7.80  )-----------( 118      ( 25 May 2018 06:09 )             32.1     05-    135  |  99  |  18  ----------------------------<  85  4.6   |  23  |  0.96    Ca    11.1<H>      25 May 2018 06:09  Mg     1.6         TPro  5.5<L>  /  Alb  2.5<L>  /  TBili  6.4<H>  /  DBili  4.7<H>  /  AST  349<H>  /  ALT  102<H>  /  AlkPhos  554<H>      PT/INR - ( 25 May 2018 06:09 )   PT: 16.4 SEC;   INR: 1.47        Lactate, Blood (18 @ 06:09)    Lactate, Blood: 2.7 mmol/L        Urinalysis Basic - ( 24 May 2018 23:22 )    Color: BROWN / Appearance: HAZY / S.029 / pH: 6.0  Gluc: NEGATIVE / Ketone: TRACE  / Bili: MODERATE / Urobili: 4 mg/dL   Blood: NEGATIVE / Protein: 30 mg/dL / Nitrite: NEGATIVE   Leuk Esterase: MODERATE / RBC: 2-5 / WBC 25-50   Sq Epi: OCC / Non Sq Epi: x / Bacteria: x        RADIOLOGY & ADDITIONAL TESTS:    Imaging Personally Reviewed: Outpt CT C/A/P reviewed in PACS  < from: [facility] Total (18 @ 16:37) >  FINDINGS: There are innumerable foci of increased tracer accumulation in   the calvarium, multiple levels of the spine, sternum, scapulae, proximal   humeri and possibly right mid humerus, bilateral ribs, pelvis, bilateral   femurs and bilateral proximal tibiae. These foci likely correspond to   diffuse sclerotic metastases seen on recent CT. There is physiologic   tracer distribution in the remainder of the visualized skeleton.    < end of copied text >      Consultant(s) Notes Reviewed:  Onc, GI

## 2018-05-26 LAB
ALBUMIN SERPL ELPH-MCNC: 2.4 G/DL — LOW (ref 3.3–5)
ALP SERPL-CCNC: 506 U/L — HIGH (ref 40–120)
ALT FLD-CCNC: 113 U/L — HIGH (ref 4–33)
APTT BLD: 30.5 SEC — SIGNIFICANT CHANGE UP (ref 27.5–37.4)
AST SERPL-CCNC: 427 U/L — HIGH (ref 4–32)
BILIRUB SERPL-MCNC: 6.8 MG/DL — HIGH (ref 0.2–1.2)
BUN SERPL-MCNC: 14 MG/DL — SIGNIFICANT CHANGE UP (ref 7–23)
CALCIUM SERPL-MCNC: 9.7 MG/DL — SIGNIFICANT CHANGE UP (ref 8.4–10.5)
CHLORIDE SERPL-SCNC: 102 MMOL/L — SIGNIFICANT CHANGE UP (ref 98–107)
CO2 SERPL-SCNC: 21 MMOL/L — LOW (ref 22–31)
CREAT SERPL-MCNC: 0.88 MG/DL — SIGNIFICANT CHANGE UP (ref 0.5–1.3)
FIBRINOGEN PPP-MCNC: 269 MG/DL — LOW (ref 310–510)
GLUCOSE SERPL-MCNC: 117 MG/DL — HIGH (ref 70–99)
HBA1C BLD-MCNC: 5.3 % — SIGNIFICANT CHANGE UP (ref 4–5.6)
HBA1C BLD-MCNC: 5.4 % — SIGNIFICANT CHANGE UP (ref 4–5.6)
HCT VFR BLD CALC: 30.2 % — LOW (ref 34.5–45)
HGB BLD-MCNC: 9.8 G/DL — LOW (ref 11.5–15.5)
INR BLD: 1.59 — HIGH (ref 0.88–1.17)
MCHC RBC-ENTMCNC: 31.4 PG — SIGNIFICANT CHANGE UP (ref 27–34)
MCHC RBC-ENTMCNC: 32.5 % — SIGNIFICANT CHANGE UP (ref 32–36)
MCV RBC AUTO: 96.8 FL — SIGNIFICANT CHANGE UP (ref 80–100)
NRBC # FLD: 0.09 — SIGNIFICANT CHANGE UP
NRBC FLD-RTO: 1.3 — SIGNIFICANT CHANGE UP
PLATELET # BLD AUTO: 113 K/UL — LOW (ref 150–400)
PMV BLD: 9 FL — SIGNIFICANT CHANGE UP (ref 7–13)
POTASSIUM SERPL-MCNC: 4.9 MMOL/L — SIGNIFICANT CHANGE UP (ref 3.5–5.3)
POTASSIUM SERPL-SCNC: 4.9 MMOL/L — SIGNIFICANT CHANGE UP (ref 3.5–5.3)
PROT SERPL-MCNC: 5.2 G/DL — LOW (ref 6–8.3)
PROTHROM AB SERPL-ACNC: 17.8 SEC — HIGH (ref 9.8–13.1)
PTH-INTACT SERPL-MCNC: 7.78 PG/ML — LOW (ref 15–65)
RBC # BLD: 3.12 M/UL — LOW (ref 3.8–5.2)
RBC # FLD: 17.4 % — HIGH (ref 10.3–14.5)
SODIUM SERPL-SCNC: 134 MMOL/L — LOW (ref 135–145)
WBC # BLD: 7.1 K/UL — SIGNIFICANT CHANGE UP (ref 3.8–10.5)
WBC # FLD AUTO: 7.1 K/UL — SIGNIFICANT CHANGE UP (ref 3.8–10.5)

## 2018-05-26 PROCEDURE — 99233 SBSQ HOSP IP/OBS HIGH 50: CPT

## 2018-05-26 RX ORDER — IBUPROFEN 200 MG
400 TABLET ORAL ONCE
Qty: 0 | Refills: 0 | Status: COMPLETED | OUTPATIENT
Start: 2018-05-26 | End: 2018-05-26

## 2018-05-26 RX ORDER — ACETAMINOPHEN 500 MG
325 TABLET ORAL ONCE
Qty: 0 | Refills: 0 | Status: COMPLETED | OUTPATIENT
Start: 2018-05-26 | End: 2018-05-26

## 2018-05-26 RX ADMIN — Medication 50 MILLIGRAM(S): at 19:06

## 2018-05-26 RX ADMIN — Medication 400 MILLIGRAM(S): at 03:26

## 2018-05-26 RX ADMIN — Medication 400 MILLIGRAM(S): at 02:22

## 2018-05-26 RX ADMIN — Medication 400 MILLIGRAM(S): at 21:28

## 2018-05-26 RX ADMIN — Medication 50 MILLIGRAM(S): at 07:00

## 2018-05-26 RX ADMIN — SODIUM CHLORIDE 125 MILLILITER(S): 9 INJECTION INTRAMUSCULAR; INTRAVENOUS; SUBCUTANEOUS at 01:38

## 2018-05-26 RX ADMIN — Medication 325 MILLIGRAM(S): at 16:30

## 2018-05-26 RX ADMIN — Medication 325 MILLIGRAM(S): at 15:40

## 2018-05-26 RX ADMIN — Medication 400 MILLIGRAM(S): at 20:49

## 2018-05-26 RX ADMIN — CALCITONIN SALMON 400 INTERNATIONAL UNIT(S): 200 INJECTION, SOLUTION INTRAMUSCULAR at 10:50

## 2018-05-26 NOTE — CHART NOTE - NSCHARTNOTEFT_GEN_A_CORE
Hematology/oncology fellow note    I came by and spoke with both the patient and her daughter , Patricia, at bedside. I had spoken with both Dr. Herman and Dr. Pro about possible current treatment options for the patient and  plan moving forward with her care. They both agreed that she needs a liver biopsy to find out what is going on. She could very well have hormone positive metastatic breast cancer in her liver vs another kind of cancer vs a non-malignant process. I explained to both the patient and her daughter that she will need an MRCP and MRI liver and gastroenterology/hepatology input to figure out what is going in her liver, find out if there is a reversible cause for her elevated bilirubin and see if a feasible intervention can be done endoscopically,  and that she will need a liver biopsy to definitely find out what is going on and once the biopsy is done, we can decide on future chemotherapy options. I explained to her that if her liver biopsy were to come back as hormone metastatic positive breast cancer , treating with chemotherapy will likely not improve her overall quality of life and will likely not improve her overall survival. I also did recommend to them at this point, palliative care. Both the patient and her daughter understood.     Kimberly Campos, DO  Hematology/Oncology Fellow Hematology/oncology fellow note    I came by and spoke with both the patient and her daughter , Patricia, at bedside. I had spoken with both Dr. Herman and Dr. Pro about possible current treatment options for the patient and  plan moving forward with her care. They both agreed that she needs a liver biopsy to find out what is going on. She could very well have hormone positive metastatic breast cancer in her liver vs another kind of cancer vs a non-malignant process. I explained to both the patient and her daughter that she will need an MRCP and MRI liver and gastroenterology/hepatology input to figure out what is going in her liver, find out if there is a reversible cause for her elevated bilirubin and see if a feasible intervention can be done endoscopically,  and that she will need a liver biopsy to definitely find out what is going on and once the biopsy is done, we can decide on future chemotherapy options. I explained to her that if her liver biopsy were to come back as hormone metastatic positive breast cancer , treating with chemotherapy will likely not improve her overall quality of life and her prognosis is poor. I also did recommend to them at this point, palliative care. Both the patient and her daughter understood.     Kimberly Campos,   Hematology/Oncology Fellow

## 2018-05-26 NOTE — PROGRESS NOTE ADULT - ASSESSMENT
56 y/o F with stage IV breast ca with mets to bone, HTN, and DM p/w weakness and n/v with recent imaging showing new hepatic mets and acute hepatitis likely from infiltrative malignant process.     1. Liver metastases.     Onc and GI note reviewed  liver biopsy on tuesday   - GI recommending MRCP to assess for obstructive process, if positive, may need possible stenting to relieve obstruction. MRCP ordered.     2. Lactic acid acidosis.     Likely 2/2 dehydration, lower suspicion of metformin as a cause  imporoving     3.Metastatic breast cancer.     Onc note reviewed   - Awaiting IR decision on liver biopsy to assess receptor status.  Oncology planning to initiate systemic chemotherapy this admission, however rising transaminases may be limiting factor.     4.Hypercalcemia of malignancy.   Appreciate onc recs  - Will resume IVF for now   - Ordered for calcitonin and Pamidronate, check daily BMP.    5. Essential hypertension.    continue metoprolol.    6. Type 2 diabetes mellitus without complication, without long-term current use of insulin.  - Humalog sliding scale.      Gi and DVT prophylaxis

## 2018-05-26 NOTE — PROGRESS NOTE ADULT - SUBJECTIVE AND OBJECTIVE BOX
Patient is a 57y old  Female who presents with a chief complaint of elevated liver enzymes, weakness, vomiting (24 May 2018 23:54)      patient seen and examine at bed side       MEDICATIONS  (STANDING):  dextrose 5%. 1000 milliLiter(s) (50 mL/Hr) IV Continuous <Continuous>  dextrose 50% Injectable 12.5 Gram(s) IV Push once  dextrose 50% Injectable 25 Gram(s) IV Push once  dextrose 50% Injectable 25 Gram(s) IV Push once  fluticasone propionate 50 MICROgram(s)/spray Nasal Spray 1 Spray(s) Both Nostrils two times a day  insulin lispro (HumaLOG) corrective regimen sliding scale   SubCutaneous three times a day before meals  insulin lispro (HumaLOG) corrective regimen sliding scale   SubCutaneous at bedtime  metoprolol tartrate 50 milliGRAM(s) Oral two times a day  ondansetron Injectable 4 milliGRAM(s) IV Push once  sodium chloride 0.9%. 1000 milliLiter(s) (125 mL/Hr) IV Continuous <Continuous>    MEDICATIONS  (PRN):  dextrose 40% Gel 15 Gram(s) Oral once PRN Blood Glucose LESS THAN 70 milliGRAM(s)/deciliter  glucagon  Injectable 1 milliGRAM(s) IntraMuscular once PRN Glucose LESS THAN 70 milligrams/deciliter  ondansetron Injectable 4 milliGRAM(s) IV Push every 8 hours PRN Nausea and/or Vomiting        Vital Signs Last 24 Hrs  T(C): 36.7 (26 May 2018 12:41), Max: 37.2 (25 May 2018 21:59)  T(F): 98.1 (26 May 2018 12:41), Max: 98.9 (25 May 2018 21:59)  HR: 77 (26 May 2018 12:41) (77 - 89)  BP: 126/65 (26 May 2018 12:41) (116/58 - 136/62)  BP(mean): --  RR: 18 (26 May 2018 12:41) (17 - 18)  SpO2: 96% (26 May 2018 12:41) (95% - 98%)    CAPILLARY BLOOD GLUCOSE      POCT Blood Glucose.: 107 mg/dL (26 May 2018 12:08)  POCT Blood Glucose.: 109 mg/dL (26 May 2018 08:46)  POCT Blood Glucose.: 160 mg/dL (25 May 2018 22:02)  POCT Blood Glucose.: 136 mg/dL (25 May 2018 17:12)      PHYSICAL EXAM  GENERAL: NAD, well-developed  EYES: EOMI, PERRLA, +scleral icterus   NECK: Supple, No JVD  CHEST/LUNG: Clear to auscultation bilaterally; No wheeze  HEART: Regular rate and rhythm; No murmurs, rubs, or gallops  ABDOMEN: Soft, +RUQ tenderness, +bowel sounds x4  EXTREMITIES:  2+ Peripheral Pulses, No clubbing, cyanosis, or edema  PSYCH: AAOx3  SKIN: jaundiced     LABS:                                           9.8    7.10  )-----------( 113      ( 26 May 2018 06:45 )             30.2       05-    134<L>  |  102  |  14  ----------------------------<  117<H>  4.9   |  21<L>  |  0.88    Ca    9.7      26 May 2018 06:45  Mg     1.6         TPro  5.2<L>  /  Alb  2.4<L>  /  TBili  6.8<H>  /  DBili  x   /  AST  427<H>  /  ALT  113<H>  /  AlkPhos  506<H>                Urinalysis Basic - ( 24 May 2018 23:22 )    Color: BROWN / Appearance: HAZY / S.029 / pH: 6.0  Gluc: NEGATIVE / Ketone: TRACE  / Bili: MODERATE / Urobili: 4 mg/dL   Blood: NEGATIVE / Protein: 30 mg/dL / Nitrite: NEGATIVE   Leuk Esterase: MODERATE / RBC: 2-5 / WBC 25-50   Sq Epi: OCC / Non Sq Epi: x / Bacteria: x        RADIOLOGY & ADDITIONAL TESTS:    Imaging Personally Reviewed: Outpt CT C/A/P reviewed in PACS  < from: [facility] Total (18 @ 16:37) >  FINDINGS: There are innumerable foci of increased tracer accumulation in   the calvarium, multiple levels of the spine, sternum, scapulae, proximal   humeri and possibly right mid humerus, bilateral ribs, pelvis, bilateral   femurs and bilateral proximal tibiae. These foci likely correspond to   diffuse sclerotic metastases seen on recent CT. There is physiologic   tracer distribution in the remainder of the visualized skeleton.    < end of copied text >      Consultant(s) Notes Reviewed:  Onc, GI

## 2018-05-27 LAB
ALBUMIN SERPL ELPH-MCNC: 2.3 G/DL — LOW (ref 3.3–5)
ALP SERPL-CCNC: 523 U/L — HIGH (ref 40–120)
ALT FLD-CCNC: 139 U/L — HIGH (ref 4–33)
APTT BLD: 33.1 SEC — SIGNIFICANT CHANGE UP (ref 27.5–37.4)
AST SERPL-CCNC: 660 U/L — HIGH (ref 4–32)
BILIRUB SERPL-MCNC: 7.9 MG/DL — HIGH (ref 0.2–1.2)
BLD GP AB SCN SERPL QL: NEGATIVE — SIGNIFICANT CHANGE UP
BUN SERPL-MCNC: 14 MG/DL — SIGNIFICANT CHANGE UP (ref 7–23)
CALCIUM SERPL-MCNC: 9.4 MG/DL — SIGNIFICANT CHANGE UP (ref 8.4–10.5)
CHLORIDE SERPL-SCNC: 101 MMOL/L — SIGNIFICANT CHANGE UP (ref 98–107)
CO2 SERPL-SCNC: 21 MMOL/L — LOW (ref 22–31)
CREAT SERPL-MCNC: 0.83 MG/DL — SIGNIFICANT CHANGE UP (ref 0.5–1.3)
FIBRINOGEN PPP-MCNC: 263 MG/DL — LOW (ref 310–510)
GLUCOSE SERPL-MCNC: 88 MG/DL — SIGNIFICANT CHANGE UP (ref 70–99)
HCT VFR BLD CALC: 32.4 % — LOW (ref 34.5–45)
HGB BLD-MCNC: 10.3 G/DL — LOW (ref 11.5–15.5)
INR BLD: 1.79 — HIGH (ref 0.88–1.17)
MCHC RBC-ENTMCNC: 30.9 PG — SIGNIFICANT CHANGE UP (ref 27–34)
MCHC RBC-ENTMCNC: 31.8 % — LOW (ref 32–36)
MCV RBC AUTO: 97.3 FL — SIGNIFICANT CHANGE UP (ref 80–100)
NRBC # FLD: 0.07 — SIGNIFICANT CHANGE UP
NRBC FLD-RTO: 1.5 — SIGNIFICANT CHANGE UP
PLATELET # BLD AUTO: 85 K/UL — LOW (ref 150–400)
PMV BLD: 8.9 FL — SIGNIFICANT CHANGE UP (ref 7–13)
POTASSIUM SERPL-MCNC: 4.6 MMOL/L — SIGNIFICANT CHANGE UP (ref 3.5–5.3)
POTASSIUM SERPL-SCNC: 4.6 MMOL/L — SIGNIFICANT CHANGE UP (ref 3.5–5.3)
PROT SERPL-MCNC: 5.1 G/DL — LOW (ref 6–8.3)
PROTHROM AB SERPL-ACNC: 20.1 SEC — HIGH (ref 9.8–13.1)
RBC # BLD: 3.33 M/UL — LOW (ref 3.8–5.2)
RBC # FLD: 17.7 % — HIGH (ref 10.3–14.5)
RH IG SCN BLD-IMP: POSITIVE — SIGNIFICANT CHANGE UP
SODIUM SERPL-SCNC: 134 MMOL/L — LOW (ref 135–145)
WBC # BLD: 4.66 K/UL — SIGNIFICANT CHANGE UP (ref 3.8–10.5)
WBC # FLD AUTO: 4.66 K/UL — SIGNIFICANT CHANGE UP (ref 3.8–10.5)

## 2018-05-27 PROCEDURE — 99222 1ST HOSP IP/OBS MODERATE 55: CPT | Mod: GC

## 2018-05-27 PROCEDURE — 99233 SBSQ HOSP IP/OBS HIGH 50: CPT

## 2018-05-27 PROCEDURE — 74183 MRI ABD W/O CNTR FLWD CNTR: CPT | Mod: 26

## 2018-05-27 RX ORDER — KETOROLAC TROMETHAMINE 30 MG/ML
15 SYRINGE (ML) INJECTION ONCE
Qty: 0 | Refills: 0 | Status: DISCONTINUED | OUTPATIENT
Start: 2018-05-27 | End: 2018-05-28

## 2018-05-27 RX ORDER — SENNA PLUS 8.6 MG/1
2 TABLET ORAL AT BEDTIME
Qty: 0 | Refills: 0 | Status: DISCONTINUED | OUTPATIENT
Start: 2018-05-27 | End: 2018-06-01

## 2018-05-27 RX ORDER — ACETAMINOPHEN 500 MG
650 TABLET ORAL ONCE
Qty: 0 | Refills: 0 | Status: COMPLETED | OUTPATIENT
Start: 2018-05-27 | End: 2018-05-27

## 2018-05-27 RX ORDER — DOCUSATE SODIUM 100 MG
100 CAPSULE ORAL THREE TIMES A DAY
Qty: 0 | Refills: 0 | Status: DISCONTINUED | OUTPATIENT
Start: 2018-05-27 | End: 2018-06-01

## 2018-05-27 RX ADMIN — Medication 650 MILLIGRAM(S): at 22:17

## 2018-05-27 RX ADMIN — Medication 650 MILLIGRAM(S): at 23:11

## 2018-05-27 RX ADMIN — Medication 50 MILLIGRAM(S): at 18:48

## 2018-05-27 RX ADMIN — Medication 100 MILLIGRAM(S): at 22:16

## 2018-05-27 RX ADMIN — SENNA PLUS 2 TABLET(S): 8.6 TABLET ORAL at 22:17

## 2018-05-27 RX ADMIN — Medication 50 MILLIGRAM(S): at 06:56

## 2018-05-27 NOTE — PROGRESS NOTE ADULT - SUBJECTIVE AND OBJECTIVE BOX
INTERVAL HPI/OVERNIGHT EVENTS:  Reports nausea, but otherwise no complaints. She is aware of the gravity of her situation and Dr. Campos had discussed with them that no treatment would be possible if the bilirubin remains elevated and if there is no biliary obstruction to be relieved.     VITAL SIGNS:  T(F): 98.5 (05-27-18 @ 12:45)  HR: 74 (05-27-18 @ 12:45)  BP: 109/61 (05-27-18 @ 12:45)  RR: 18 (05-27-18 @ 12:45)  SpO2: 97% (05-27-18 @ 12:45)  Wt(kg): --    PHYSICAL EXAM:    Constitutional: NAD, jaundiced  Eyes: EOMI, sclera non-icteric  Neck: supple, no masses, no JVD  Respiratory: CTA b/l, good air entry b/l  Cardiovascular: RRR, no M/R/G  Gastrointestinal: soft, NTND, no masses palpable, + BS, no hepatosplenomegaly  Extremities: no c/c/e  Neurological: AAOx3      MEDICATIONS  (STANDING):  dextrose 5%. 1000 milliLiter(s) (50 mL/Hr) IV Continuous <Continuous>  dextrose 50% Injectable 12.5 Gram(s) IV Push once  dextrose 50% Injectable 25 Gram(s) IV Push once  dextrose 50% Injectable 25 Gram(s) IV Push once  fluticasone propionate 50 MICROgram(s)/spray Nasal Spray 1 Spray(s) Both Nostrils two times a day  insulin lispro (HumaLOG) corrective regimen sliding scale   SubCutaneous three times a day before meals  insulin lispro (HumaLOG) corrective regimen sliding scale   SubCutaneous at bedtime  metoprolol tartrate 50 milliGRAM(s) Oral two times a day  ondansetron Injectable 4 milliGRAM(s) IV Push once    MEDICATIONS  (PRN):  dextrose 40% Gel 15 Gram(s) Oral once PRN Blood Glucose LESS THAN 70 milliGRAM(s)/deciliter  glucagon  Injectable 1 milliGRAM(s) IntraMuscular once PRN Glucose LESS THAN 70 milligrams/deciliter  ondansetron Injectable 4 milliGRAM(s) IV Push every 8 hours PRN Nausea and/or Vomiting      Allergies    No Known Allergies    Intolerances    latex (Other (Skin))      LABS:                        10.3   4.66  )-----------( 85       ( 27 May 2018 06:02 )             32.4     05-27    134<L>  |  101  |  14  ----------------------------<  88  4.6   |  21<L>  |  0.83    Ca    9.4      27 May 2018 06:02    TPro  5.1<L>  /  Alb  2.3<L>  /  TBili  7.9<H>  /  DBili  x   /  AST  660<H>  /  ALT  139<H>  /  AlkPhos  523<H>  05-27    PT/INR - ( 27 May 2018 06:02 )   PT: 20.1 SEC;   INR: 1.79          PTT - ( 27 May 2018 06:02 )  PTT:33.1 SEC      RADIOLOGY & ADDITIONAL TESTS:  Studies reviewed.    ASSESSMENT & PLAN:

## 2018-05-27 NOTE — PROGRESS NOTE ADULT - ASSESSMENT
56 y/o F with DM, HTN, and breast ca (ER/MA + Syy1raq -) was on letrozole with bone mets s/p RT to hip 2016 found to have transaminitis, elevated total bilirubin and new liver lesions. Also found to have cirrhosis vs. pseudocirrhosis on imaging. 58 y/o F with DM, HTN, and breast ca (ER/MI + Lek7znq -) was on letrozole with bone mets s/p RT to hip 2016 found to have transaminitis, elevated total bilirubin and new liver lesions. Also found to have cirrhosis vs. pseudocirrhosis on imaging.

## 2018-05-27 NOTE — PROGRESS NOTE ADULT - PROBLEM SELECTOR PLAN 1
- no therapeutic options available if her liver function continues to decline and there is no biliary obstruction that can be reversed  - await MRI abdomen result  - will likely need referral for hospice, discussed this with the patient and her daughter, they would prefer if the patient could go home if that is the case - no therapeutic options available if her liver function continues to decline and there is no biliary obstruction that can be reversed  - await MRI abdomen result  - will likely need referral for hospice, discussed this with the patient and her daughter, they would prefer if the patient could go home if that is the case  - hold off liver biopsy unless her candidacy for treatment improves

## 2018-05-27 NOTE — PROGRESS NOTE ADULT - SUBJECTIVE AND OBJECTIVE BOX
Patient is a 57y old  Female who presents with a chief complaint of elevated liver enzymes, weakness, vomiting (24 May 2018 23:54)      patient seen and examine at bed side   complaining about abdominal pain       MEDICATIONS  (STANDING):  dextrose 5%. 1000 milliLiter(s) (50 mL/Hr) IV Continuous <Continuous>  dextrose 50% Injectable 12.5 Gram(s) IV Push once  dextrose 50% Injectable 25 Gram(s) IV Push once  dextrose 50% Injectable 25 Gram(s) IV Push once  fluticasone propionate 50 MICROgram(s)/spray Nasal Spray 1 Spray(s) Both Nostrils two times a day  insulin lispro (HumaLOG) corrective regimen sliding scale   SubCutaneous three times a day before meals  insulin lispro (HumaLOG) corrective regimen sliding scale   SubCutaneous at bedtime  metoprolol tartrate 50 milliGRAM(s) Oral two times a day  ondansetron Injectable 4 milliGRAM(s) IV Push once    MEDICATIONS  (PRN):  dextrose 40% Gel 15 Gram(s) Oral once PRN Blood Glucose LESS THAN 70 milliGRAM(s)/deciliter  glucagon  Injectable 1 milliGRAM(s) IntraMuscular once PRN Glucose LESS THAN 70 milligrams/deciliter  ondansetron Injectable 4 milliGRAM(s) IV Push every 8 hours PRN Nausea and/or Vomiting          Vital Signs Last 24 Hrs  T(C): 36.9 (27 May 2018 12:45), Max: 36.9 (27 May 2018 12:45)  T(F): 98.5 (27 May 2018 12:45), Max: 98.5 (27 May 2018 12:45)  HR: 74 (27 May 2018 12:45) (74 - 84)  BP: 109/61 (27 May 2018 12:45) (109/61 - 128/60)  BP(mean): --  RR: 18 (27 May 2018 12:45) (17 - 18)  SpO2: 97% (27 May 2018 12:45) (94% - 97%)      CAPILLARY BLOOD GLUCOSE      POCT Blood Glucose.: 118 mg/dL (27 May 2018 12:20)  POCT Blood Glucose.: 85 mg/dL (27 May 2018 07:33)  POCT Blood Glucose.: 97 mg/dL (26 May 2018 22:23)  POCT Blood Glucose.: 105 mg/dL (26 May 2018 17:18)      PHYSICAL EXAM  GENERAL: NAD, well-developed  EYES: EOMI, PERRLA, +scleral icterus   NECK: Supple, No JVD  CHEST/LUNG: Clear to auscultation bilaterally; No wheeze  HEART: Regular rate and rhythm; No murmurs, rubs, or gallops  ABDOMEN: Soft, +RUQ tenderness, +bowel sounds x4  EXTREMITIES:  2+ Peripheral Pulses, No clubbing, cyanosis, or edema  PSYCH: AAOx3  SKIN: jaundiced     LABS:                                                               10.3   4.66  )-----------( 85       ( 27 May 2018 06:02 )             32.4           134<L>  |  101  |  14  ----------------------------<  88  4.6   |  21<L>  |  0.83    Ca    9.4      27 May 2018 06:02    TPro  5.1<L>  /  Alb  2.3<L>  /  TBili  7.9<H>  /  DBili  x   /  AST  660<H>  /  ALT  139<H>  /  AlkPhos  523<H>                  Urinalysis Basic - ( 24 May 2018 23:22 )    Color: BROWN / Appearance: HAZY / S.029 / pH: 6.0  Gluc: NEGATIVE / Ketone: TRACE  / Bili: MODERATE / Urobili: 4 mg/dL   Blood: NEGATIVE / Protein: 30 mg/dL / Nitrite: NEGATIVE   Leuk Esterase: MODERATE / RBC: 2-5 / WBC 25-50   Sq Epi: OCC / Non Sq Epi: x / Bacteria: x        RADIOLOGY & ADDITIONAL TESTS:    Imaging Personally Reviewed: Outpt CT C/A/P reviewed in PACS  < from: [facility] (18 @ 16:37) >  FINDINGS: There are innumerable foci of increased tracer accumulation in   the calvarium, multiple levels of the spine, sternum, scapulae, proximal   humeri and possibly right mid humerus, bilateral ribs, pelvis, bilateral   femurs and bilateral proximal tibiae. These foci likely correspond to   diffuse sclerotic metastases seen on recent CT. There is physiologic   tracer distribution in the remainder of the visualized skeleton.    < end of copied text >      Consultant(s) Notes Reviewed:  Onc, GI

## 2018-05-27 NOTE — PROGRESS NOTE ADULT - ASSESSMENT
56 y/o F with stage IV breast ca with mets to bone, HTN, and DM p/w weakness and n/v with recent imaging showing new hepatic mets and acute hepatitis likely from infiltrative malignant process.     1. Liver metastases.     Onc and GI note reviewed  liver biopsy on tuesday   MRI abdomen still pending   - GI recommending MRCP to assess for obstructive process, if positive, may need possible stenting to relieve obstruction. MRCP ordered.     2. Lactic acid acidosis.     Likely 2/2 dehydration, lower suspicion of metformin as a cause  improving     3.Metastatic breast cancer.     Onc note reviewed   - Awaiting IR decision on liver biopsy to assess receptor status.  Oncology planning to initiate systemic chemotherapy this admission, however rising transaminases may be limiting factor.     4.Hypercalcemia of malignancy.   Appreciate onc recs  - Will resume IVF for now   - Ordered for calcitonin and Pamidronate, check daily BMP.    5. Essential hypertension.    continue metoprolol.    6. Type 2 diabetes mellitus without complication, without long-term current use of insulin.  - Humalog sliding scale.      Gi and DVT prophylaxis

## 2018-05-28 LAB
ALBUMIN SERPL ELPH-MCNC: 2.2 G/DL — LOW (ref 3.3–5)
ALP SERPL-CCNC: 532 U/L — HIGH (ref 40–120)
ALT FLD-CCNC: 132 U/L — HIGH (ref 4–33)
APTT BLD: 33.1 SEC — SIGNIFICANT CHANGE UP (ref 27.5–37.4)
AST SERPL-CCNC: 639 U/L — HIGH (ref 4–32)
BILIRUB SERPL-MCNC: 8.3 MG/DL — HIGH (ref 0.2–1.2)
BUN SERPL-MCNC: 15 MG/DL — SIGNIFICANT CHANGE UP (ref 7–23)
CALCIUM SERPL-MCNC: 9.3 MG/DL — SIGNIFICANT CHANGE UP (ref 8.4–10.5)
CHLORIDE SERPL-SCNC: 98 MMOL/L — SIGNIFICANT CHANGE UP (ref 98–107)
CO2 SERPL-SCNC: 21 MMOL/L — LOW (ref 22–31)
CREAT SERPL-MCNC: 0.84 MG/DL — SIGNIFICANT CHANGE UP (ref 0.5–1.3)
FIBRINOGEN PPP-MCNC: 262 MG/DL — LOW (ref 310–510)
GLUCOSE SERPL-MCNC: 94 MG/DL — SIGNIFICANT CHANGE UP (ref 70–99)
HBV CORE AB SER-ACNC: NONREACTIVE — SIGNIFICANT CHANGE UP
HBV SURFACE AB SER-ACNC: NONREACTIVE — SIGNIFICANT CHANGE UP
HBV SURFACE AG SER-ACNC: NONREACTIVE — SIGNIFICANT CHANGE UP
HCT VFR BLD CALC: 30.9 % — LOW (ref 34.5–45)
HCV AB S/CO SERPL IA: 0.07 S/CO — SIGNIFICANT CHANGE UP
HCV AB SERPL-IMP: SIGNIFICANT CHANGE UP
HGB BLD-MCNC: 10.1 G/DL — LOW (ref 11.5–15.5)
HIV COMBO RESULT: SIGNIFICANT CHANGE UP
HIV1+2 AB SPEC QL: SIGNIFICANT CHANGE UP
INR BLD: 1.88 — HIGH (ref 0.88–1.17)
MCHC RBC-ENTMCNC: 31.6 PG — SIGNIFICANT CHANGE UP (ref 27–34)
MCHC RBC-ENTMCNC: 32.7 % — SIGNIFICANT CHANGE UP (ref 32–36)
MCV RBC AUTO: 96.6 FL — SIGNIFICANT CHANGE UP (ref 80–100)
NRBC # FLD: 0.07 — SIGNIFICANT CHANGE UP
NRBC FLD-RTO: 1.2 — SIGNIFICANT CHANGE UP
PLATELET # BLD AUTO: 78 K/UL — LOW (ref 150–400)
PMV BLD: 9 FL — SIGNIFICANT CHANGE UP (ref 7–13)
POTASSIUM SERPL-MCNC: 4.8 MMOL/L — SIGNIFICANT CHANGE UP (ref 3.5–5.3)
POTASSIUM SERPL-SCNC: 4.8 MMOL/L — SIGNIFICANT CHANGE UP (ref 3.5–5.3)
PROT SERPL-MCNC: 5 G/DL — LOW (ref 6–8.3)
PROTHROM AB SERPL-ACNC: 21.1 SEC — HIGH (ref 9.8–13.1)
RBC # BLD: 3.2 M/UL — LOW (ref 3.8–5.2)
RBC # FLD: 17.9 % — HIGH (ref 10.3–14.5)
RH IG SCN BLD-IMP: POSITIVE — SIGNIFICANT CHANGE UP
SODIUM SERPL-SCNC: 131 MMOL/L — LOW (ref 135–145)
WBC # BLD: 6.07 K/UL — SIGNIFICANT CHANGE UP (ref 3.8–10.5)
WBC # FLD AUTO: 6.07 K/UL — SIGNIFICANT CHANGE UP (ref 3.8–10.5)

## 2018-05-28 PROCEDURE — 99233 SBSQ HOSP IP/OBS HIGH 50: CPT

## 2018-05-28 RX ORDER — IBUPROFEN 200 MG
400 TABLET ORAL ONCE
Qty: 0 | Refills: 0 | Status: COMPLETED | OUTPATIENT
Start: 2018-05-28 | End: 2018-05-28

## 2018-05-28 RX ADMIN — Medication 400 MILLIGRAM(S): at 23:34

## 2018-05-28 RX ADMIN — Medication 100 MILLIGRAM(S): at 22:26

## 2018-05-28 RX ADMIN — Medication 50 MILLIGRAM(S): at 19:01

## 2018-05-28 RX ADMIN — Medication 400 MILLIGRAM(S): at 22:25

## 2018-05-28 RX ADMIN — Medication 400 MILLIGRAM(S): at 09:30

## 2018-05-28 RX ADMIN — Medication 100 MILLIGRAM(S): at 06:11

## 2018-05-28 RX ADMIN — Medication 400 MILLIGRAM(S): at 08:43

## 2018-05-28 RX ADMIN — Medication 100 MILLIGRAM(S): at 14:29

## 2018-05-28 RX ADMIN — Medication 50 MILLIGRAM(S): at 06:11

## 2018-05-28 NOTE — PROGRESS NOTE ADULT - SUBJECTIVE AND OBJECTIVE BOX
Patient is a 57y old  Female who presents with a chief complaint of elevated liver enzymes, weakness, vomiting (24 May 2018 23:54)      patient seen and examine at bed side   complaining about abdominal pain       MEDICATIONS  (STANDING):  dextrose 5%. 1000 milliLiter(s) (50 mL/Hr) IV Continuous <Continuous>  dextrose 50% Injectable 12.5 Gram(s) IV Push once  dextrose 50% Injectable 25 Gram(s) IV Push once  dextrose 50% Injectable 25 Gram(s) IV Push once  docusate sodium 100 milliGRAM(s) Oral three times a day  fluticasone propionate 50 MICROgram(s)/spray Nasal Spray 1 Spray(s) Both Nostrils two times a day  insulin lispro (HumaLOG) corrective regimen sliding scale   SubCutaneous three times a day before meals  insulin lispro (HumaLOG) corrective regimen sliding scale   SubCutaneous at bedtime  metoprolol tartrate 50 milliGRAM(s) Oral two times a day  ondansetron Injectable 4 milliGRAM(s) IV Push once  senna 2 Tablet(s) Oral at bedtime    MEDICATIONS  (PRN):  dextrose 40% Gel 15 Gram(s) Oral once PRN Blood Glucose LESS THAN 70 milliGRAM(s)/deciliter  glucagon  Injectable 1 milliGRAM(s) IntraMuscular once PRN Glucose LESS THAN 70 milligrams/deciliter  ondansetron Injectable 4 milliGRAM(s) IV Push every 8 hours PRN Nausea and/or Vomiting            Vital Signs Last 24 Hrs  T(C): 37.1 (28 May 2018 13:25), Max: 37.1 (28 May 2018 13:25)  T(F): 98.7 (28 May 2018 13:25), Max: 98.7 (28 May 2018 13:25)  HR: 74 (28 May 2018 13:25) (71 - 78)  BP: 114/52 (28 May 2018 13:25) (114/52 - 133/52)  BP(mean): --  RR: 18 (28 May 2018 13:25) (16 - 18)  SpO2: 96% (28 May 2018 13:25) (96% - 99%)    CAPILLARY BLOOD GLUCOSE    CAPILLARY BLOOD GLUCOSE      POCT Blood Glucose.: 120 mg/dL (28 May 2018 12:26)  POCT Blood Glucose.: 93 mg/dL (28 May 2018 07:37)  POCT Blood Glucose.: 107 mg/dL (27 May 2018 22:16)  POCT Blood Glucose.: 89 mg/dL (27 May 2018 18:58)      PHYSICAL EXAM  GENERAL: NAD, well-developed  EYES: EOMI, PERRLA, +scleral icterus   NECK: Supple, No JVD  CHEST/LUNG: Clear to auscultation bilaterally; No wheeze  HEART: Regular rate and rhythm; No murmurs, rubs, or gallops  ABDOMEN: Soft, +RUQ tenderness, +bowel sounds x4  EXTREMITIES:  2+ Peripheral Pulses, No clubbing, cyanosis, or edema  PSYCH: AAOx3  SKIN: jaundiced     LABS:                                                                                 10.1   6.07  )-----------( 78       ( 28 May 2018 04:00 )             30.9       05-28    131<L>  |  98  |  15  ----------------------------<  94  4.8   |  21<L>  |  0.84    Ca    9.3      28 May 2018 04:00    TPro  5.0<L>  /  Alb  2.2<L>  /  TBili  8.3<H>  /  DBili  x   /  AST  639<H>  /  ALT  132<H>  /  AlkPhos  532<H>                    Urinalysis Basic - ( 24 May 2018 23:22 )    Color: BROWN / Appearance: HAZY / S.029 / pH: 6.0  Gluc: NEGATIVE / Ketone: TRACE  / Bili: MODERATE / Urobili: 4 mg/dL   Blood: NEGATIVE / Protein: 30 mg/dL / Nitrite: NEGATIVE   Leuk Esterase: MODERATE / RBC: 2-5 / WBC 25-50   Sq Epi: OCC / Non Sq Epi: x / Bacteria: x        RADIOLOGY & ADDITIONAL TESTS:    Imaging Personally Reviewed: Outpt CT C/A/P reviewed in PACS  < from: [facility] Total (18 @ 16:37) >  FINDINGS: There are innumerable foci of increased tracer accumulation in   the calvarium, multiple levels of the spine, sternum, scapulae, proximal   humeri and possibly right mid humerus, bilateral ribs, pelvis, bilateral   femurs and bilateral proximal tibiae. These foci likely correspond to   diffuse sclerotic metastases seen on recent CT. There is physiologic   tracer distribution in the remainder of the visualized skeleton.    < end of copied text >      Consultant(s) Notes Reviewed:  Onc, GI

## 2018-05-28 NOTE — PROGRESS NOTE ADULT - ASSESSMENT
58 y/o F with stage IV breast ca with mets to bone, HTN, and DM p/w weakness and n/v with recent imaging showing new hepatic mets and acute hepatitis likely from infiltrative malignant process.     1. Liver metastases.     Onc and GI note reviewed  hold on liver biopsy as per onco  MRI no obstruction   f/u with GI   prognosis guarded   palliative care consult     2. Lactic acid acidosis.     Likely 2/2 dehydration, lower suspicion of metformin as a cause  improving     3.Metastatic breast cancer.     Onc note reviewed   - Awaiting IR decision on liver biopsy to assess receptor status.  Oncology planning to initiate systemic chemotherapy this admission, however rising transaminases may be limiting factor.     4.Hypercalcemia of malignancy.   Appreciate onc recs  - Will resume IVF for now   - Ordered for calcitonin and Pamidronate, check daily BMP.    5. Essential hypertension.    continue metoprolol.    6. Type 2 diabetes mellitus without complication, without long-term current use of insulin.  - Humalog sliding scale.      Gi and DVT prophylaxis

## 2018-05-29 DIAGNOSIS — R53.81 OTHER MALAISE: ICD-10-CM

## 2018-05-29 DIAGNOSIS — Z51.5 ENCOUNTER FOR PALLIATIVE CARE: ICD-10-CM

## 2018-05-29 DIAGNOSIS — R14.0 ABDOMINAL DISTENSION (GASEOUS): ICD-10-CM

## 2018-05-29 DIAGNOSIS — R69 ILLNESS, UNSPECIFIED: ICD-10-CM

## 2018-05-29 DIAGNOSIS — R10.11 RIGHT UPPER QUADRANT PAIN: ICD-10-CM

## 2018-05-29 DIAGNOSIS — R11.0 NAUSEA: ICD-10-CM

## 2018-05-29 DIAGNOSIS — C50.919 MALIGNANT NEOPLASM OF UNSPECIFIED SITE OF UNSPECIFIED FEMALE BREAST: ICD-10-CM

## 2018-05-29 DIAGNOSIS — R06.02 SHORTNESS OF BREATH: ICD-10-CM

## 2018-05-29 LAB
ALBUMIN SERPL ELPH-MCNC: 2 G/DL — LOW (ref 3.3–5)
ALP SERPL-CCNC: 545 U/L — HIGH (ref 40–120)
ALT FLD-CCNC: 115 U/L — HIGH (ref 4–33)
AST SERPL-CCNC: 522 U/L — HIGH (ref 4–32)
BILIRUB SERPL-MCNC: 8.4 MG/DL — HIGH (ref 0.2–1.2)
BUN SERPL-MCNC: 17 MG/DL — SIGNIFICANT CHANGE UP (ref 7–23)
BUN SERPL-MCNC: 17 MG/DL — SIGNIFICANT CHANGE UP (ref 7–23)
CALCIUM SERPL-MCNC: 9.3 MG/DL — SIGNIFICANT CHANGE UP (ref 8.4–10.5)
CALCIUM SERPL-MCNC: 9.3 MG/DL — SIGNIFICANT CHANGE UP (ref 8.4–10.5)
CHLORIDE SERPL-SCNC: 98 MMOL/L — SIGNIFICANT CHANGE UP (ref 98–107)
CHLORIDE SERPL-SCNC: 98 MMOL/L — SIGNIFICANT CHANGE UP (ref 98–107)
CO2 SERPL-SCNC: 20 MMOL/L — LOW (ref 22–31)
CO2 SERPL-SCNC: 20 MMOL/L — LOW (ref 22–31)
CREAT SERPL-MCNC: 0.85 MG/DL — SIGNIFICANT CHANGE UP (ref 0.5–1.3)
CREAT SERPL-MCNC: 0.85 MG/DL — SIGNIFICANT CHANGE UP (ref 0.5–1.3)
FIBRINOGEN PPP-MCNC: 291.1 MG/DL — LOW (ref 310–510)
GLUCOSE SERPL-MCNC: 90 MG/DL — SIGNIFICANT CHANGE UP (ref 70–99)
GLUCOSE SERPL-MCNC: 90 MG/DL — SIGNIFICANT CHANGE UP (ref 70–99)
HCT VFR BLD CALC: 31.7 % — LOW (ref 34.5–45)
HCT VFR BLD CALC: 33.2 % — LOW (ref 34.5–45)
HCV RNA SERPL NAA DL=5-ACNC: NOT DETECTED IU/ML — SIGNIFICANT CHANGE UP
HCV RNA SPEC NAA+PROBE-LOG IU: SIGNIFICANT CHANGE UP LOGIU/ML
HGB BLD-MCNC: 10.4 G/DL — LOW (ref 11.5–15.5)
HGB BLD-MCNC: 10.7 G/DL — LOW (ref 11.5–15.5)
INR BLD: 2.15 — HIGH (ref 0.88–1.17)
MCHC RBC-ENTMCNC: 30.7 PG — SIGNIFICANT CHANGE UP (ref 27–34)
MCHC RBC-ENTMCNC: 31.3 PG — SIGNIFICANT CHANGE UP (ref 27–34)
MCHC RBC-ENTMCNC: 32.2 % — SIGNIFICANT CHANGE UP (ref 32–36)
MCHC RBC-ENTMCNC: 32.8 % — SIGNIFICANT CHANGE UP (ref 32–36)
MCV RBC AUTO: 95.4 FL — SIGNIFICANT CHANGE UP (ref 80–100)
MCV RBC AUTO: 95.5 FL — SIGNIFICANT CHANGE UP (ref 80–100)
NRBC # FLD: 0.11 — SIGNIFICANT CHANGE UP
NRBC # FLD: 0.15 — SIGNIFICANT CHANGE UP
NRBC FLD-RTO: 1.6 — SIGNIFICANT CHANGE UP
NRBC FLD-RTO: 2 — SIGNIFICANT CHANGE UP
PLATELET # BLD AUTO: 74 K/UL — LOW (ref 150–400)
PLATELET # BLD AUTO: 80 K/UL — LOW (ref 150–400)
PMV BLD: 8.8 FL — SIGNIFICANT CHANGE UP (ref 7–13)
PMV BLD: 8.9 FL — SIGNIFICANT CHANGE UP (ref 7–13)
POTASSIUM SERPL-MCNC: 4.9 MMOL/L — SIGNIFICANT CHANGE UP (ref 3.5–5.3)
POTASSIUM SERPL-MCNC: 4.9 MMOL/L — SIGNIFICANT CHANGE UP (ref 3.5–5.3)
POTASSIUM SERPL-SCNC: 4.9 MMOL/L — SIGNIFICANT CHANGE UP (ref 3.5–5.3)
POTASSIUM SERPL-SCNC: 4.9 MMOL/L — SIGNIFICANT CHANGE UP (ref 3.5–5.3)
PROT SERPL-MCNC: 4.8 G/DL — LOW (ref 6–8.3)
PROTHROM AB SERPL-ACNC: 24.2 SEC — HIGH (ref 9.8–13.1)
RBC # BLD: 3.32 M/UL — LOW (ref 3.8–5.2)
RBC # BLD: 3.48 M/UL — LOW (ref 3.8–5.2)
RBC # FLD: 18.6 % — HIGH (ref 10.3–14.5)
RBC # FLD: 18.6 % — HIGH (ref 10.3–14.5)
SODIUM SERPL-SCNC: 130 MMOL/L — LOW (ref 135–145)
SODIUM SERPL-SCNC: 130 MMOL/L — LOW (ref 135–145)
WBC # BLD: 6.91 K/UL — SIGNIFICANT CHANGE UP (ref 3.8–10.5)
WBC # BLD: 7.67 K/UL — SIGNIFICANT CHANGE UP (ref 3.8–10.5)
WBC # FLD AUTO: 6.91 K/UL — SIGNIFICANT CHANGE UP (ref 3.8–10.5)
WBC # FLD AUTO: 7.67 K/UL — SIGNIFICANT CHANGE UP (ref 3.8–10.5)

## 2018-05-29 PROCEDURE — 99223 1ST HOSP IP/OBS HIGH 75: CPT | Mod: GC

## 2018-05-29 PROCEDURE — 99232 SBSQ HOSP IP/OBS MODERATE 35: CPT | Mod: GC

## 2018-05-29 PROCEDURE — 99233 SBSQ HOSP IP/OBS HIGH 50: CPT

## 2018-05-29 PROCEDURE — 71045 X-RAY EXAM CHEST 1 VIEW: CPT | Mod: 26

## 2018-05-29 RX ORDER — MORPHINE SULFATE 50 MG/1
2.5 CAPSULE, EXTENDED RELEASE ORAL EVERY 4 HOURS
Qty: 0 | Refills: 0 | Status: DISCONTINUED | OUTPATIENT
Start: 2018-05-29 | End: 2018-05-31

## 2018-05-29 RX ORDER — DEXAMETHASONE 0.5 MG/5ML
4 ELIXIR ORAL
Qty: 0 | Refills: 0 | Status: DISCONTINUED | OUTPATIENT
Start: 2018-05-29 | End: 2018-06-01

## 2018-05-29 RX ADMIN — Medication 50 MILLIGRAM(S): at 18:10

## 2018-05-29 RX ADMIN — MORPHINE SULFATE 2.5 MILLIGRAM(S): 50 CAPSULE, EXTENDED RELEASE ORAL at 16:30

## 2018-05-29 RX ADMIN — Medication 100 MILLIGRAM(S): at 15:48

## 2018-05-29 RX ADMIN — MORPHINE SULFATE 2.5 MILLIGRAM(S): 50 CAPSULE, EXTENDED RELEASE ORAL at 15:44

## 2018-05-29 RX ADMIN — Medication 100 MILLIGRAM(S): at 23:13

## 2018-05-29 RX ADMIN — Medication 100 MILLIGRAM(S): at 05:22

## 2018-05-29 RX ADMIN — Medication 50 MILLIGRAM(S): at 05:22

## 2018-05-29 RX ADMIN — Medication 4 MILLIGRAM(S): at 19:02

## 2018-05-29 NOTE — CONSULT NOTE ADULT - SUBJECTIVE AND OBJECTIVE BOX
HPI:  58 y/o F with DM, HTN, and breast ca (ER/DE + [id number] -) on letrozole with bone and liver mets s/p RT to hip 2016 p/w nausea, vomiting and weakness.  Pt recently moved from [place] where she had been established with an oncologist.  She was previously treated with Xgeva, and more recently with Ibrance which she stopped 2/2 adverse effects.  Pt established care at [facility] last week, and had imaging with bone scan and CT c/a/p which showed diffuse bone mets, and new diffuse liver mets.  Pt reports feeling nauseated with frequent vomiting for the past month, which has worsened over the past 2 weeks.  She has had minimal appetite.  She has been having decreased UOP with small amount of dark urine described as the color of "dark honey."  She went to urgent care 3 weeks ago 2/2 concern for UTI, but was told UA was negative.  She reports no fever or dysuria.  She has felt very weak.      2/2 pt's symptoms, imaging findings, and labs showing transaminitis with elevated INR, pt was told to come to the ED for further management.      Pt with complaint of ruq pain.  +Nausea with difficulty keeping food down.  Pt does have BM and passing gas.  Pt living with daughter and son in law      PERTINENT PMH REVIEWED:  [ x] YES [ ] NO           SOCIAL HISTORY:  Significant other/partner:  [ ] YES  [x ] NO            Children:  [x ] YES  [ ] NO                   Gnosticist/Spirituality:  Substance hx:  [ ] YES   [ ] NO           Tobacco hx:  [ ] YES  [ ] NO             Alcohol hx: [ ] YES  [ ] NO        Home Opioid hx:  [ ] YES  [ ] NO   Living Situation: [ ] Home  [ ] Long term care  [ ] Rehab    FAMILY HISTORY:  No pertinent family history in first degree relatives    [ ] Family history non contributory     BASELINE ADLs (prior to admission):  Independent [ x] moderately [ ] fully   Dependent   [ ] moderately [ ] fully    Code Status:                      MOLST  [ ] YES [ ] NO    Living Will  [ ] YES [ ] NO    Health Care Proxy [ ] YES  [ ] NO      [ ] Surrogate  [ ] HCP  [ ] Guardian:        daughter                                                          Phone#:    Allergies    No Known Allergies    Intolerances    latex (Other (Skin))      MEDICATIONS  (STANDING):  dexamethasone     Tablet 4 milliGRAM(s) Oral two times a day  dextrose 5%. 1000 milliLiter(s) (50 mL/Hr) IV Continuous <Continuous>  dextrose 50% Injectable 12.5 Gram(s) IV Push once  dextrose 50% Injectable 25 Gram(s) IV Push once  dextrose 50% Injectable 25 Gram(s) IV Push once  docusate sodium 100 milliGRAM(s) Oral three times a day  fluticasone propionate 50 MICROgram(s)/spray Nasal Spray 1 Spray(s) Both Nostrils two times a day  insulin lispro (HumaLOG) corrective regimen sliding scale   SubCutaneous three times a day before meals  insulin lispro (HumaLOG) corrective regimen sliding scale   SubCutaneous at bedtime  metoprolol tartrate 50 milliGRAM(s) Oral two times a day  ondansetron Injectable 4 milliGRAM(s) IV Push once  senna 2 Tablet(s) Oral at bedtime    MEDICATIONS  (PRN):  dextrose 40% Gel 15 Gram(s) Oral once PRN Blood Glucose LESS THAN 70 milliGRAM(s)/deciliter  glucagon  Injectable 1 milliGRAM(s) IntraMuscular once PRN Glucose LESS THAN 70 milligrams/deciliter  morphine   Solution 2.5 milliGRAM(s) Oral every 4 hours PRN For moderate to severe pain  ondansetron Injectable 4 milliGRAM(s) IV Push every 8 hours PRN Nausea and/or Vomiting      PRESENT SYMPTOMS:  Source: [ x] Patient   [x ] Family   [ ] Team     Pain: [x ] YES [ ] NO  Onset:  last few weeks              Location:   RUQ                       Duration:   mins                  Aggravating factors: palpating                       Relieving factors: none    Radiation:  non            Timing:  comes and goes                            Severity:        5/10              Character: dull    Dyspnea: [ ] YES [x ] NO - Mild [ ]  Moderate [ ]  Severe [ ]    Anxiety: [ ] YES [x ] NO  Fatigue: [x ] YES [ ] NO   Nausea: [x ] YES [ ] NO  Loss of appetite: [x ] YES [ ] NO   Constipation: [ ] YES [ ]x NO     Other Symptoms:  [ x] All other review of systems negative   [ ] Unable to obtain due to poor mentation     Does patient meet criteria for Severe Protein Calorie Malnutrition?  Yes [ ]  No [ ]  PPSV 30% or below [ ]  Anasarca [ ]  Albumin < 2 [ ] Catabolic State [ ] Poor nutritional intake [ ] Significant weight loss [ ]      Palliative Performance Status Version 2:     50    %  ECOG -        Vital Signs Last 24 Hrs  T(C): 37.1 (29 May 2018 05:20), Max: 37.1 (28 May 2018 21:32)  T(F): 98.7 (29 May 2018 05:20), Max: 98.7 (28 May 2018 21:32)  HR: 70 (29 May 2018 18:08) (70 - 79)  BP: 133/70 (29 May 2018 18:08) (113/48 - 133/70)  BP(mean): --  RR: 17 (29 May 2018 15:43) (17 - 18)  SpO2: 98% (29 May 2018 05:20) (96% - 98%)    Physical Exam:    General: [ ] Alert,  A&O x     [ ] lethargic   [ ] Agitated   [ ] Cachexia   HEENT: [ ] Normal   [ ] Dry mouth   [ ] ET Tube    [ ] Trach   Lungs: [ ] Clear [ ] Rhonchi  [ ] Crackles [ ] Wheezing [ ] Tachypnea  [ ] Audible excessive secretions    Cardiovascular:  [ ] Regular rate and rhythm  [ ] Irregular [ ] Tachycardia   [ ] Bradycardia   Abdomen: [ ] Soft  [ ] Distended  [ ] +BS  [ ] Non tender [ ] Tender  [ ]PEG   [ ] NGT   Last BM:     Genitourinary: [ ] Normal [ ] Incontinent   [ ] Oliguria/Anuria   [ ] Quintana  Musculoskeletal:  [ ] Normal   [ ] Generalized weakness  [ ] Bedbound  [ ] Edema   Neurological: [ ] No focal deficits  [ ] Cognitive impairment     Skin: [ ] Normal   [ ] Pressure ulcers     LABS:                        10.4   6.91  )-----------( 74       ( 29 May 2018 06:29 )             31.7     05-29    130<L>  |  98  |  17  ----------------------------<  90  4.9   |  20<L>  |  0.85    Ca    9.3      29 May 2018 06:29    TPro  4.8<L>  /  Alb  2.0<L>  /  TBili  8.4<H>  /  DBili  x   /  AST  522<H>  /  ALT  115<H>  /  AlkPhos  545<H>  05-29    PT/INR - ( 29 May 2018 06:29 )   PT: 24.2 SEC;   INR: 2.15          PTT - ( 28 May 2018 04:00 )  PTT:33.1 SEC    I&O's Summary      RADIOLOGY & ADDITIONAL STUDIES:    Referrals:  Hospice [ ]   Chaplaincy [ ]    Child Life [ ]   Social Work [ ]   Case Management [ ]   Holistic Therapy [ ]

## 2018-05-29 NOTE — PROGRESS NOTE ADULT - PROBLEM SELECTOR PLAN 5
- continue metoprolol - Previous imaging reviewed, pt with small bilateral pleural effusions and atelectasis likely contributing to SOB  - No role for thoracentesis at this time  - Repeat CXR pending

## 2018-05-29 NOTE — PROGRESS NOTE ADULT - PROBLEM SELECTOR PLAN 6
- hold metformin   - Humalog sliding scale - s/p IVF, continue PO fluid intake as tolerated  - s/p calcitonin and Pamidronate with response

## 2018-05-29 NOTE — PROGRESS NOTE ADULT - SUBJECTIVE AND OBJECTIVE BOX
HEPATOLOGY PROGRESS NOTE    Chief Complaint:  Patient is a 57y old  Female who presents with a chief complaint of elevated liver enzymes, weakness, vomiting (24 May 2018 23:54)      Interval Events: No events overnight.  MRI completed, no dilated ducts but significant infiltration of liver. + abdominal pain    Allergies:  latex (Other (Skin))  No Known Allergies      Hospital Medications:  dextrose 40% Gel 15 Gram(s) Oral once PRN  dextrose 5%. 1000 milliLiter(s) IV Continuous <Continuous>  dextrose 50% Injectable 12.5 Gram(s) IV Push once  dextrose 50% Injectable 25 Gram(s) IV Push once  dextrose 50% Injectable 25 Gram(s) IV Push once  docusate sodium 100 milliGRAM(s) Oral three times a day  fluticasone propionate 50 MICROgram(s)/spray Nasal Spray 1 Spray(s) Both Nostrils two times a day  glucagon  Injectable 1 milliGRAM(s) IntraMuscular once PRN  insulin lispro (HumaLOG) corrective regimen sliding scale   SubCutaneous three times a day before meals  insulin lispro (HumaLOG) corrective regimen sliding scale   SubCutaneous at bedtime  metoprolol tartrate 50 milliGRAM(s) Oral two times a day  ondansetron Injectable 4 milliGRAM(s) IV Push every 8 hours PRN  ondansetron Injectable 4 milliGRAM(s) IV Push once  senna 2 Tablet(s) Oral at bedtime      PMHX/PSHX:  Essential hypertension  Diabetes  Breast cancer  No significant past surgical history      Family history:  No pertinent family history in first degree relatives      ROS:     General:  No wt loss, fevers, chills, night sweats, fatigue,   Eyes:  Good vision, no reported pain  ENT:  No sore throat, pain, runny nose, dysphagia  CV:  No pain, palpitations, hypo/hypertension  Resp:  No dyspnea, cough, tachypnea, wheezing  GI:  See HPI  :  No pain, bleeding, incontinence, nocturia  Muscle:  No pain, weakness  Neuro:  No weakness, tingling, memory problems  Psych:  No fatigue, insomnia, mood problems, depression  Endocrine:  No polyuria, polydipsia, cold/heat intolerance  Heme:  No petechiae, ecchymosis, easy bruisability  Skin:  No rash, edema      PHYSICAL EXAM:     GENERAL:  Appears stated age, well-groomed, well-nourished, no distress  HEENT:  NC/AT, jaundice  CHEST:  Full & symmetric excursion, no increased effort, breath sounds clear  HEART:  Regular rhythm, S1, S2, no murmur/rub/S3/S4,  no edema  ABDOMEN:  Soft, non-tender, non-distended, normoactive bowel sounds, obese  EXTREMITIES:  no cyanosis,clubbing or edema  SKIN:  No rash  NEURO:  Alert, oriented    Vital Signs:  Vital Signs Last 24 Hrs  T(C): 37.1 (29 May 2018 05:20), Max: 37.1 (28 May 2018 13:25)  T(F): 98.7 (29 May 2018 05:20), Max: 98.7 (28 May 2018 13:25)  HR: 76 (29 May 2018 05:20) (74 - 82)  BP: 126/92 (29 May 2018 05:20) (113/48 - 133/64)  BP(mean): --  RR: 18 (29 May 2018 05:20) (17 - 18)  SpO2: 98% (29 May 2018 05:20) (96% - 98%)  Daily     Daily Weight in k.8 (29 May 2018 05:20)    LABS:                        10.4   6.91  )-----------( 74       ( 29 May 2018 06:29 )             31.7     05-29    130<L>  |  98  |  17  ----------------------------<  90  4.9   |  20<L>  |  0.85    Ca    9.3      29 May 2018 06:29    TPro  4.8<L>  /  Alb  2.0<L>  /  TBili  8.4<H>  /  DBili  x   /  AST  522<H>  /  ALT  115<H>  /  AlkPhos  545<H>      LIVER FUNCTIONS - ( 29 May 2018 06:29 )  Alb: 2.0 g/dL / Pro: 4.8 g/dL / ALK PHOS: 545 u/L / ALT: 115 u/L / AST: 522 u/L / GGT: x           PT/INR - ( 29 May 2018 06:29 )   PT: 24.2 SEC;   INR: 2.15          PTT - ( 28 May 2018 04:00 )  PTT:33.1 SEC        Imaging:    < from: MR Abdomen w/wo IV Cont (18 @ 18:22) >    EXAM:  MR ABDOMEN WAW IC        PROCEDURE DATE:  May 27 2018         INTERPRETATION:  CLINICAL INFORMATION: Breast cancer. Rising   transaminases.    COMPARISON: CT 2018.    PROCEDURE:   MRI of the abdomen was performed with and without intravenous contrast.  10 ml of Gadavist was administered intravenously without complications   and 0 ml was discarded.    MRCP was performed.    FINDINGS:    LOWER CHEST: Small right and moderate left pleural effusions with   associated compressive atelectasis.    LIVER: Markedly nodular contour. Innumerable small lesions in all lobes.  BILE DUCTS: Normal caliber.  GALLBLADDER: Gallstones.  SPLEEN: Within normal limits.  PANCREAS: Within normal limits.  ADRENALS: Within normal limits.  KIDNEYS/URETERS:Within normal limits.    VISUALIZED PORTIONS:    BOWEL: Small hiatal hernia.   PERITONEUM: Small free fluid in the abdomen.  VESSELS: Within normal limits.  RETROPERITONEUM: No lymphadenopathy.    ABDOMINAL WALL: Anasarca.  BONES: Abnormal T2 signal is noted within the spine but is not well   evaluated.    IMPRESSION: Cirrhosis versus pseudocirrhosis. Innumerable metastatic   lesions in the liver. Normal biliary tree.                    WENDI CRANE M.D. ATTENDING RADIOLOGIST  This document has beenelectronically signed. May 28 2018  8:21AM        < end of copied text >

## 2018-05-29 NOTE — PROGRESS NOTE ADULT - PROBLEM SELECTOR PLAN 4
- s/p IVF, continue PO fluid intake as tolerated  - s/p calcitonin and Pamidronate with response - I performed bedside sonogram on patient today, pt with very trace ascites in RUQ and RLQ, no safe pocket to perform paracentesis  - Explained to pt and family that there is a high chance that she will develop increasing amounts of fluid, for which she will need IR follow up for paracentesis, but at this time, no role for intervention

## 2018-05-29 NOTE — PROGRESS NOTE ADULT - ASSESSMENT
Ms. Kendrick is a 58 y/o F, with h/o metastatic ER/CT positive HER2 negative breast cancer, was on letrozole admitted with diffuse liver mets and visceral crises.

## 2018-05-29 NOTE — PROGRESS NOTE ADULT - PROBLEM SELECTOR PLAN 1
-Patient admitted with visceral crises. D/W patient, son in law at bedside.  -MRCP reviewed, no biliary dilatation-  likely liver dysfunction from metastatic disease and no reversible cause.  -Given diffuse metastatic disease in the liver and uptrending bilirubin ( > 8), patient is not a candidate for any disease modifying therapy.   -No indication for liver biopsy, as patient is not a treatment candidate.  -Supportive care provided and discussed about symptoms control and home hospice. Will consult palliative for symptoms control and to help facilitate hospice.

## 2018-05-29 NOTE — PROGRESS NOTE ADULT - SUBJECTIVE AND OBJECTIVE BOX
INTERVAL HPI/OVERNIGHT EVENTS:  Patient S&E at bedside.   Icteric. Complaints of abdominal pain and bloating sensation.   Passing gas. She is not able to tolerate oral intake.  VITAL SIGNS:  T(F): 98.7 (05-29-18 @ 05:20)  HR: 76 (05-29-18 @ 05:20)  BP: 126/92 (05-29-18 @ 05:20)  RR: 18 (05-29-18 @ 05:20)  SpO2: 98% (05-29-18 @ 05:20)  Wt(kg): --    PHYSICAL EXAM:    Constitutional: WDWN, NAD,   Eyes: PERRL, EOMI, sclera non-icteric  Neck: supple, no masses, no JVD  Respiratory: CTA b/l, decreased air entry, b/l crackles  Cardiovascular: RRR, normal S1S2, no M/R/G  Gastrointestinal: soft, RUQ tenderenss  Extremities: WWP, no c/c/e  Neurological: AAOx3  Skin: Icterus+    MEDICATIONS  (STANDING):  dextrose 5%. 1000 milliLiter(s) (50 mL/Hr) IV Continuous <Continuous>  dextrose 50% Injectable 12.5 Gram(s) IV Push once  dextrose 50% Injectable 25 Gram(s) IV Push once  dextrose 50% Injectable 25 Gram(s) IV Push once  docusate sodium 100 milliGRAM(s) Oral three times a day  fluticasone propionate 50 MICROgram(s)/spray Nasal Spray 1 Spray(s) Both Nostrils two times a day  insulin lispro (HumaLOG) corrective regimen sliding scale   SubCutaneous three times a day before meals  insulin lispro (HumaLOG) corrective regimen sliding scale   SubCutaneous at bedtime  metoprolol tartrate 50 milliGRAM(s) Oral two times a day  ondansetron Injectable 4 milliGRAM(s) IV Push once  senna 2 Tablet(s) Oral at bedtime    MEDICATIONS  (PRN):  dextrose 40% Gel 15 Gram(s) Oral once PRN Blood Glucose LESS THAN 70 milliGRAM(s)/deciliter  glucagon  Injectable 1 milliGRAM(s) IntraMuscular once PRN Glucose LESS THAN 70 milligrams/deciliter  morphine   Solution 2.5 milliGRAM(s) Oral every 4 hours PRN For moderate to severe pain  ondansetron Injectable 4 milliGRAM(s) IV Push every 8 hours PRN Nausea and/or Vomiting      Allergies    No Known Allergies    Intolerances    latex (Other (Skin))      LABS:                        10.4   6.91  )-----------( 74       ( 29 May 2018 06:29 )             31.7     05-29    130<L>  |  98  |  17  ----------------------------<  90  4.9   |  20<L>  |  0.85    Ca    9.3      29 May 2018 06:29    TPro  4.8<L>  /  Alb  2.0<L>  /  TBili  8.4<H>  /  DBili  x   /  AST  522<H>  /  ALT  115<H>  /  AlkPhos  545<H>  05-29    PT/INR - ( 29 May 2018 06:29 )   PT: 24.2 SEC;   INR: 2.15          PTT - ( 28 May 2018 04:00 )  PTT:33.1 SEC      RADIOLOGY & ADDITIONAL TESTS:  < from: MR Abdomen w/wo IV Cont (05.27.18 @ 18:22) >  EXAM:  MR ABDOMEN WAW IC        PROCEDURE DATE:  May 27 2018         INTERPRETATION:  CLINICAL INFORMATION: Breast cancer. Rising   transaminases.    COMPARISON: CT 5/18/2018.    PROCEDURE:   MRI of the abdomen was performed with and without intravenous contrast.  10 ml of Gadavist was administered intravenously without complications   and 0 ml was discarded.    MRCP was performed.    FINDINGS:    LOWER CHEST: Small right and moderate left pleural effusions with   associated compressive atelectasis.    LIVER: Markedly nodular contour. Innumerable small lesions in all lobes.  BILE DUCTS: Normal caliber.  GALLBLADDER: Gallstones.  SPLEEN: Within normal limits.  PANCREAS: Within normal limits.  ADRENALS: Within normal limits.  KIDNEYS/URETERS:Within normal limits.    VISUALIZED PORTIONS:    BOWEL: Small hiatal hernia.   PERITONEUM: Small free fluid in the abdomen.  VESSELS: Within normal limits.  RETROPERITONEUM: No lymphadenopathy.    ABDOMINAL WALL: Anasarca.  BONES: Abnormal T2 signal is noted within the spine but is not well   evaluated.    IMPRESSION: Cirrhosis versus pseudocirrhosis. Innumerable metastatic   lesions in the liver. Normal biliary tree.                    WENDI CRANE M.D. ATTENDING RADIOLOGIST  This document has beenelectronically signed. May 28 2018  8:21AM    < end of copied text >

## 2018-05-29 NOTE — PROGRESS NOTE ADULT - SUBJECTIVE AND OBJECTIVE BOX
Patient is a 57y old  Female who presents with a chief complaint of elevated liver enzymes, weakness, vomiting (24 May 2018 23:54)      SUBJECTIVE / OVERNIGHT EVENTS: Pt had a so-so weekend.  Pain is getting worse, mainly in RUQ, especially to touch.  Pt feels increasingly short of breath and can only hold water/apple juice down.  Pt reports passing gas and had a BM yesterday.  Pt is aware of overall prognosis and findings from MRCP.        MEDICATIONS  (STANDING):  dextrose 5%. 1000 milliLiter(s) (50 mL/Hr) IV Continuous <Continuous>  dextrose 50% Injectable 12.5 Gram(s) IV Push once  dextrose 50% Injectable 25 Gram(s) IV Push once  dextrose 50% Injectable 25 Gram(s) IV Push once  docusate sodium 100 milliGRAM(s) Oral three times a day  fluticasone propionate 50 MICROgram(s)/spray Nasal Spray 1 Spray(s) Both Nostrils two times a day  insulin lispro (HumaLOG) corrective regimen sliding scale   SubCutaneous three times a day before meals  insulin lispro (HumaLOG) corrective regimen sliding scale   SubCutaneous at bedtime  metoprolol tartrate 50 milliGRAM(s) Oral two times a day  ondansetron Injectable 4 milliGRAM(s) IV Push once  senna 2 Tablet(s) Oral at bedtime    MEDICATIONS  (PRN):  dextrose 40% Gel 15 Gram(s) Oral once PRN Blood Glucose LESS THAN 70 milliGRAM(s)/deciliter  glucagon  Injectable 1 milliGRAM(s) IntraMuscular once PRN Glucose LESS THAN 70 milligrams/deciliter  morphine   Solution 2.5 milliGRAM(s) Oral every 4 hours PRN For moderate to severe pain  ondansetron Injectable 4 milliGRAM(s) IV Push every 8 hours PRN Nausea and/or Vomiting      Vital Signs Last 24 Hrs  T(C): 37.1 (29 May 2018 05:20), Max: 37.1 (28 May 2018 21:32)  T(F): 98.7 (29 May 2018 05:20), Max: 98.7 (28 May 2018 21:32)  HR: 76 (29 May 2018 05:20) (76 - 82)  BP: 126/92 (29 May 2018 05:20) (113/48 - 133/64)  BP(mean): --  RR: 18 (29 May 2018 05:20) (17 - 18)  SpO2: 98% (29 May 2018 05:20) (96% - 98%)  CAPILLARY BLOOD GLUCOSE      POCT Blood Glucose.: 110 mg/dL (29 May 2018 12:10)  POCT Blood Glucose.: 89 mg/dL (29 May 2018 07:40)  POCT Blood Glucose.: 96 mg/dL (28 May 2018 22:13)  POCT Blood Glucose.: 131 mg/dL (28 May 2018 17:10)      PHYSICAL EXAM  GENERAL: NAD  HEAD:  Atraumatic, Normocephalic  EYES: EOMI, PERRLA, +scleral icterus   NECK: Supple, No JVD  CHEST/LUNG: Decreased breath sounds in bases, no wheezes  HEART: Regular rate and rhythm; No murmurs, rubs, or gallops  ABDOMEN: Soft, +tenderness to palpation in RUQ and LUQ.  Minimally distended  EXTREMITIES: No clubbing, cyanosis, trace edema  PSYCH: AAOx3  SKIN: Jaundiced    LABS:                        10.4   6.91  )-----------( 74       ( 29 May 2018 06:29 )             31.7     05-29    130<L>  |  98  |  17  ----------------------------<  90  4.9   |  20<L>  |  0.85    Ca    9.3      29 May 2018 06:29    TPro  4.8<L>  /  Alb  2.0<L>  /  TBili  8.4<H>  /  DBili  x   /  AST  522<H>  /  ALT  115<H>  /  AlkPhos  545<H>  05-29    PT/INR - ( 29 May 2018 06:29 )   PT: 24.2 SEC;   INR: 2.15          PTT - ( 28 May 2018 04:00 )  PTT:33.1 SEC          RADIOLOGY & ADDITIONAL TESTS:    Imaging Personally Reviewed:  < from: MR Abdomen w/wo IV Cont (05.27.18 @ 18:22) >  IMPRESSION: Cirrhosis versus pseudocirrhosis. Innumerable metastatic   lesions in the liver. Normal biliary tree.      < end of copied text >    Consultant(s) Notes Reviewed:  Oncology, GI    Care Discussed with Consultants/Other Providers: Palliative Care Dr. Canada, Oncology Dr. Gomez, GI Dr. Rainey

## 2018-05-29 NOTE — PROGRESS NOTE ADULT - ASSESSMENT
Impression:  56yo F with DM, HTN, and breast ca (ER/ND + Dyt1kgi -) on letrozole with bone and liver mets s/p RT to hip 2016 p/w nausea, vomiting and weakness  Found to have multiple liver lesions, likely metastatic disease, for which hepatology was consulted.    Problem List:  1) Diffuse liver lesions likely metastatic breast ca with abnormal liver tests likely 2/2 infiltrative disease.  Nodular morphology of liver on CT/hard smooth texture on exam is likely from metastatic lesions, less likely new cirrhosis  2) Metastastic breast ca to bones, liver as above  3) HTN  4) DM  5) PO intolerance    Plan:  - pending review of MRI with advanced endoscopy team, however, given normal appearance of biliary tree on imaging, there is likely no role for biliary stenting, as the elevated TB and transaminases are not 2/2 obstruction but due to infiltration of the liver with cancer itself  - liver biopsy and further management per oncology and primary team  - if patient is continuing to have PO intolerance, would consider upper GI series to assess for outflow obstruction  - trend CMP, INR, CBC daily

## 2018-05-29 NOTE — PROGRESS NOTE ADULT - PROBLEM SELECTOR PLAN 1
- Discussed with pt and son-in law at bedside today.  Risk of liver biopsy outweighs the benefit at this point and given that pt is not a candidate for chemotherapy, liver biopsy will be cancelled (IR informed)  - MRCP reviewed, no obstructive process for stenting.  Liver with numerable mets that is likely causing her acute liver injury.

## 2018-05-29 NOTE — CONSULT NOTE ADULT - ASSESSMENT
56 yo female with met breast cancer presented with nausea, vomiting +jaundice with billiary obstruction due to increased liver mets

## 2018-05-29 NOTE — PROGRESS NOTE ADULT - ASSESSMENT
58 y/o F with stage IV breast ca with mets to bone, HTN, and DM p/w weakness and n/v with recent imaging showing new hepatic mets and acute liver injury likely from infiltrative malignant process.

## 2018-05-29 NOTE — PROGRESS NOTE ADULT - PROBLEM SELECTOR PROBLEM 6
Type 2 diabetes mellitus without complication, without long-term current use of insulin Hypercalcemia of malignancy

## 2018-05-29 NOTE — CONSULT NOTE ADULT - PROBLEM SELECTOR RECOMMENDATION 2
pt does not like zofran  consider reglan and if not helpful trial haldol 0.5mg prn
- Ca 11.5 likely 2/2 malignancy  - check PTH and PTHrP in am  - Continue with IVF. pamidronate x 1 given  - Give calcitonin 4 units/kg BID for two doses   - Continue to monitor

## 2018-05-29 NOTE — CONSULT NOTE ADULT - PROBLEM SELECTOR RECOMMENDATION 5
spoke with daughter and son in law  goal is to take pt home with hospice   referral to be made in am  overall prognosis poor and daughter asked for prognosis of weeks

## 2018-05-29 NOTE — CONSULT NOTE ADULT - PROBLEM SELECTOR PROBLEM 1
Metastatic breast cancer
Malignant neoplasm of female breast, unspecified estrogen receptor status, unspecified laterality, unspecified site of breast

## 2018-05-29 NOTE — PROGRESS NOTE ADULT - PROBLEM SELECTOR PLAN 7
Will keep on SCD's for now given elevated INR and anticipation of liver met biopsy     IMPROVE VTE Individual Risk Assessment          RISK                                                          Points  [  ] Previous VTE                                                3  [  ] Thrombophilia                                             2  [  ] Lower limb paralysis                                   2        (unable to hold up >15 seconds)    [ x ] Current Cancer                                             2         (within 6 months)  [  ] Immobilization > 24 hrs                              1  [  ] ICU/CCU stay > 24 hours                             1  [  ] Age > 60                                                         1    IMPROVE VTE Score: 2 - continue metoprolol

## 2018-05-29 NOTE — CONSULT NOTE ADULT - PROBLEM SELECTOR RECOMMENDATION 3
start decadron 4mg bid   oxy ir 5mg prn
- GI consult -Consider MRCP to determine if there is an obstruction causing elevated bili and alk phos.    Will continue to follow

## 2018-05-30 LAB
APTT BLD: 37 SEC — SIGNIFICANT CHANGE UP (ref 27.5–37.4)
FIBRINOGEN PPP-MCNC: 322.1 MG/DL — SIGNIFICANT CHANGE UP (ref 310–510)
INR BLD: 1.95 — HIGH (ref 0.88–1.17)
PROTHROM AB SERPL-ACNC: 22.7 SEC — HIGH (ref 9.8–13.1)

## 2018-05-30 PROCEDURE — 99232 SBSQ HOSP IP/OBS MODERATE 35: CPT | Mod: GC

## 2018-05-30 PROCEDURE — 74241: CPT | Mod: 26

## 2018-05-30 PROCEDURE — 99233 SBSQ HOSP IP/OBS HIGH 50: CPT

## 2018-05-30 RX ORDER — METOCLOPRAMIDE HCL 10 MG
5 TABLET ORAL EVERY 8 HOURS
Qty: 0 | Refills: 0 | Status: DISCONTINUED | OUTPATIENT
Start: 2018-05-30 | End: 2018-06-01

## 2018-05-30 RX ORDER — OXYCODONE HYDROCHLORIDE 5 MG/1
5 TABLET ORAL EVERY 4 HOURS
Qty: 0 | Refills: 0 | Status: DISCONTINUED | OUTPATIENT
Start: 2018-05-30 | End: 2018-05-31

## 2018-05-30 RX ADMIN — Medication 100 MILLIGRAM(S): at 05:22

## 2018-05-30 RX ADMIN — Medication 5 MILLIGRAM(S): at 13:15

## 2018-05-30 RX ADMIN — Medication 5 MILLIGRAM(S): at 21:43

## 2018-05-30 RX ADMIN — Medication 100 MILLIGRAM(S): at 13:14

## 2018-05-30 RX ADMIN — Medication 4 MILLIGRAM(S): at 05:22

## 2018-05-30 RX ADMIN — Medication 4 MILLIGRAM(S): at 17:31

## 2018-05-30 RX ADMIN — Medication 100 MILLIGRAM(S): at 21:43

## 2018-05-30 RX ADMIN — Medication 50 MILLIGRAM(S): at 17:31

## 2018-05-30 RX ADMIN — Medication 50 MILLIGRAM(S): at 05:22

## 2018-05-30 NOTE — PROGRESS NOTE ADULT - SUBJECTIVE AND OBJECTIVE BOX
Chief Complaint:  Patient is a 57y old  Female who presents with a chief complaint of elevated liver enzymes, weakness, vomiting (24 May 2018 23:54)      Interval Events:     Allergies:  latex (Other (Skin))  No Known Allergies      Home Medications:    Hospital Medications:  dexamethasone     Tablet 4 milliGRAM(s) Oral two times a day  dextrose 40% Gel 15 Gram(s) Oral once PRN  dextrose 5%. 1000 milliLiter(s) IV Continuous <Continuous>  dextrose 50% Injectable 12.5 Gram(s) IV Push once  dextrose 50% Injectable 25 Gram(s) IV Push once  dextrose 50% Injectable 25 Gram(s) IV Push once  docusate sodium 100 milliGRAM(s) Oral three times a day  fluticasone propionate 50 MICROgram(s)/spray Nasal Spray 1 Spray(s) Both Nostrils two times a day  glucagon  Injectable 1 milliGRAM(s) IntraMuscular once PRN  insulin lispro (HumaLOG) corrective regimen sliding scale   SubCutaneous three times a day before meals  insulin lispro (HumaLOG) corrective regimen sliding scale   SubCutaneous at bedtime  metoclopramide Injectable 5 milliGRAM(s) IV Push every 8 hours PRN  metoprolol tartrate 50 milliGRAM(s) Oral two times a day  morphine   Solution 2.5 milliGRAM(s) Oral every 4 hours PRN  oxyCODONE    IR 5 milliGRAM(s) Oral every 4 hours PRN  senna 2 Tablet(s) Oral at bedtime      PMHX/PSHX:  Essential hypertension  Diabetes  Breast cancer  No significant past surgical history      Family history:  No pertinent family history in first degree relatives      ROS:     General:  No wt loss, fevers, chills, night sweats, fatigue,   Eyes:  Good vision, no reported pain  ENT:  No sore throat, pain, runny nose, dysphagia  CV:  No pain, palpitations, hypo/hypertension  Resp:  No dyspnea, cough, tachypnea, wheezing  GI:  No pain, No nausea, No vomiting, No diarrhea, No constipation, No weight loss, No fever, No pruritis, No rectal bleeding, No tarry stools, No dysphagia,  :  No pain, bleeding, incontinence, nocturia  Muscle:  No pain, weakness  Neuro:  No weakness, tingling, memory problems  Psych:  No fatigue, insomnia, mood problems, depression  Endocrine:  No polyuria, polydipsia, cold/heat intolerance  Heme:  No petechiae, ecchymosis, easy bruisability  Skin:  No rash, tattoos, scars, edema      PHYSICAL EXAM:   Vital Signs:  Vital Signs Last 24 Hrs  T(C): 36.3 (30 May 2018 05:09), Max: 36.7 (29 May 2018 20:40)  T(F): 97.4 (30 May 2018 05:09), Max: 98 (29 May 2018 20:40)  HR: 72 (30 May 2018 05:09) (70 - 80)  BP: 116/60 (30 May 2018 05:09) (116/60 - 133/70)  BP(mean): --  RR: 18 (30 May 2018 05:09) (17 - 18)  SpO2: 96% (30 May 2018 05:09) (96% - 99%)  Daily     Daily Weight in k.2 (30 May 2018 05:09)    GENERAL:  Appears stated age, well-groomed, well-nourished, no distress  HEENT:  NC/AT,  conjunctivae clear and pink, no thyromegaly, nodules, adenopathy, no JVD, sclera -anicteric  CHEST:  Full & symmetric excursion, no increased effort, breath sounds clear  HEART:  Regular rhythm, S1, S2, no murmur/rub/S3/S4, no abdominal bruit, no edema  ABDOMEN:  Soft, non-tender, non-distended, normoactive bowel sounds,  no masses ,no hepato-splenomegaly, no signs of chronic liver disease  EXTEREMITIES:  no cyanosis,clubbing or edema  SKIN:  No rash/erythema/ecchymoses/petechiae/wounds/abscess/warm/dry  NEURO:  Alert, oriented, no asterixis, no tremor, no encephalopathy    LABS:                        10.7   7.67  )-----------( 80       ( 29 May 2018 19:00 )             33.2     05-29    130<L>  |  98  |  17  ----------------------------<  90  4.9   |  20<L>  |  0.85    Ca    9.3      29 May 2018 06:29    TPro  4.8<L>  /  Alb  2.0<L>  /  TBili  8.4<H>  /  DBili  x   /  AST  522<H>  /  ALT  115<H>  /  AlkPhos  545<H>  05-29    LIVER FUNCTIONS - ( 29 May 2018 06:29 )  Alb: 2.0 g/dL / Pro: 4.8 g/dL / ALK PHOS: 545 u/L / ALT: 115 u/L / AST: 522 u/L / GGT: x           PT/INR - ( 30 May 2018 06:30 )   PT: 22.7 SEC;   INR: 1.95          PTT - ( 30 May 2018 06:30 )  PTT:37.0 SEC        Imaging: Chief Complaint:  Patient is a 57y old  Female who presents with a chief complaint of elevated liver enzymes, weakness, vomiting (24 May 2018 23:54)      Interval Events:   Patient able to tolerate some PO yesterday. Was able to keep down liquids and ice cream but still feels some discomfort with eating. She is moving her bowels normally.  Denies any current abdominal pain. She reports intermittent episodes of chest burning and acid reflux.    Allergies:  latex (Other (Skin))  No Known Allergies      Home Medications:    Hospital Medications:  dexamethasone     Tablet 4 milliGRAM(s) Oral two times a day  dextrose 40% Gel 15 Gram(s) Oral once PRN  dextrose 5%. 1000 milliLiter(s) IV Continuous <Continuous>  dextrose 50% Injectable 12.5 Gram(s) IV Push once  dextrose 50% Injectable 25 Gram(s) IV Push once  dextrose 50% Injectable 25 Gram(s) IV Push once  docusate sodium 100 milliGRAM(s) Oral three times a day  fluticasone propionate 50 MICROgram(s)/spray Nasal Spray 1 Spray(s) Both Nostrils two times a day  glucagon  Injectable 1 milliGRAM(s) IntraMuscular once PRN  insulin lispro (HumaLOG) corrective regimen sliding scale   SubCutaneous three times a day before meals  insulin lispro (HumaLOG) corrective regimen sliding scale   SubCutaneous at bedtime  metoclopramide Injectable 5 milliGRAM(s) IV Push every 8 hours PRN  metoprolol tartrate 50 milliGRAM(s) Oral two times a day  morphine   Solution 2.5 milliGRAM(s) Oral every 4 hours PRN  oxyCODONE    IR 5 milliGRAM(s) Oral every 4 hours PRN  senna 2 Tablet(s) Oral at bedtime      PMHX/PSHX:  Essential hypertension  Diabetes  Breast cancer  No significant past surgical history      Family history:  No pertinent family history in first degree relatives      ROS:   as abve      PHYSICAL EXAM:   Vital Signs:  Vital Signs Last 24 Hrs  T(C): 36.3 (30 May 2018 05:09), Max: 36.7 (29 May 2018 20:40)  T(F): 97.4 (30 May 2018 05:09), Max: 98 (29 May 2018 20:40)  HR: 72 (30 May 2018 05:09) (70 - 80)  BP: 116/60 (30 May 2018 05:09) (116/60 - 133/70)  BP(mean): --  RR: 18 (30 May 2018 05:09) (17 - 18)  SpO2: 96% (30 May 2018 05:09) (96% - 99%)  Daily     Daily Weight in k.2 (30 May 2018 05:09)    GENERAL:  NAD  HEENT:  sclera icteric  CHEST:  Full & symmetric excursion, no increased effort, breath sounds clear  HEART:  Regular rhythm, S1, S2, no murmur/rub/S3/S4, no abdominal bruit, no edema  ABDOMEN:  Soft, non-tender, non-distended, normoactive bowel sounds,  no masses ,no hepato-splenomegaly, no signs of chronic liver disease  EXTEREMITIES:  no cyanosis,clubbing or edema  SKIN:  +jaundice  NEURO:  Alert, oriented, no asterixis, no tremor, no encephalopathy    LABS:                        10.7   7.67  )-----------( 80       ( 29 May 2018 19:00 )             33.2     05-29    130<L>  |  98  |  17  ----------------------------<  90  4.9   |  20<L>  |  0.85    Ca    9.3      29 May 2018 06:29    TPro  4.8<L>  /  Alb  2.0<L>  /  TBili  8.4<H>  /  DBili  x   /  AST  522<H>  /  ALT  115<H>  /  AlkPhos  545<H>  0529    LIVER FUNCTIONS - ( 29 May 2018 06:29 )  Alb: 2.0 g/dL / Pro: 4.8 g/dL / ALK PHOS: 545 u/L / ALT: 115 u/L / AST: 522 u/L / GGT: x           PT/INR - ( 30 May 2018 06:30 )   PT: 22.7 SEC;   INR: 1.95          PTT - ( 30 May 2018 06:30 )  PTT:37.0 SEC        Imaging:

## 2018-05-30 NOTE — PROGRESS NOTE ADULT - PROBLEM SELECTOR PLAN 1
- Risk of liver biopsy outweighs the benefit at this point and given that pt is not a candidate for chemotherapy, liver biopsy cancelled (IR informed)  - MRCP reviewed, no obstructive process for stenting.  Liver with numerable mets that is likely causing her acute liver injury.

## 2018-05-30 NOTE — PROGRESS NOTE ADULT - PROBLEM SELECTOR PLAN 4
- I performed bedside sonogram on patient, pt with very trace ascites in RUQ and RLQ, no safe pocket to perform paracentesis  - Explained to pt and family that there is a high chance that she will develop increasing amounts of fluid, for which she will need IR follow up for paracentesis, but at this time, no role for intervention

## 2018-05-30 NOTE — PROGRESS NOTE ADULT - SUBJECTIVE AND OBJECTIVE BOX
Patient is a 57y old  Female who presents with a chief complaint of elevated liver enzymes, weakness, vomiting (24 May 2018 23:54)      SUBJECTIVE / OVERNIGHT EVENTS: Pt would like to try advanced diet today to regulars.  Pain is still present, tried low dose morphine, but felt dizzy without much pain control.  Currently declining PO Oxycodone.  Went for upper GI series this morning, awaiting results.       MEDICATIONS  (STANDING):  dexamethasone     Tablet 4 milliGRAM(s) Oral two times a day  dextrose 5%. 1000 milliLiter(s) (50 mL/Hr) IV Continuous <Continuous>  dextrose 50% Injectable 12.5 Gram(s) IV Push once  dextrose 50% Injectable 25 Gram(s) IV Push once  dextrose 50% Injectable 25 Gram(s) IV Push once  docusate sodium 100 milliGRAM(s) Oral three times a day  fluticasone propionate 50 MICROgram(s)/spray Nasal Spray 1 Spray(s) Both Nostrils two times a day  insulin lispro (HumaLOG) corrective regimen sliding scale   SubCutaneous three times a day before meals  insulin lispro (HumaLOG) corrective regimen sliding scale   SubCutaneous at bedtime  metoprolol tartrate 50 milliGRAM(s) Oral two times a day  senna 2 Tablet(s) Oral at bedtime    MEDICATIONS  (PRN):  dextrose 40% Gel 15 Gram(s) Oral once PRN Blood Glucose LESS THAN 70 milliGRAM(s)/deciliter  glucagon  Injectable 1 milliGRAM(s) IntraMuscular once PRN Glucose LESS THAN 70 milligrams/deciliter  metoclopramide Injectable 5 milliGRAM(s) IV Push every 8 hours PRN Nausea/ vomiting  morphine   Solution 2.5 milliGRAM(s) Oral every 4 hours PRN For moderate to severe pain  oxyCODONE    IR 5 milliGRAM(s) Oral every 4 hours PRN Moderate to Severe Pain      Vital Signs Last 24 Hrs  T(C): 36.2 (30 May 2018 12:58), Max: 36.7 (29 May 2018 20:40)  T(F): 97.2 (30 May 2018 12:58), Max: 98 (29 May 2018 20:40)  HR: 73 (30 May 2018 12:58) (70 - 80)  BP: 136/72 (30 May 2018 12:58) (116/60 - 136/72)  BP(mean): --  RR: 18 (30 May 2018 12:58) (17 - 18)  SpO2: 98% (30 May 2018 12:58) (96% - 99%)  CAPILLARY BLOOD GLUCOSE      POCT Blood Glucose.: 134 mg/dL (30 May 2018 12:19)  POCT Blood Glucose.: 138 mg/dL (30 May 2018 07:38)  POCT Blood Glucose.: 113 mg/dL (29 May 2018 21:02)  POCT Blood Glucose.: 104 mg/dL (29 May 2018 17:22)    I&O's Summary      PHYSICAL EXAM  GENERAL: NAD, well-developed  HEAD:  Atraumatic, Normocephalic  EYES: EOMI, PERRLA, scleral icterus   NECK: Supple, No JVD  CHEST/LUNG: Clear to auscultation bilaterally; No wheeze  HEART: Regular rate and rhythm; No murmurs, rubs, or gallops  ABDOMEN: Soft, +TTP diffusely, minimally distended; Bowel sounds present  EXTREMITIES:  2+ Peripheral Pulses, trace edema   PSYCH: AAOx3  SKIN: No rashes or lesions, jaundiced    LABS:                        10.7   7.67  )-----------( 80       ( 29 May 2018 19:00 )             33.2     05-29    130<L>  |  98  |  17  ----------------------------<  90  4.9   |  20<L>  |  0.85    Ca    9.3      29 May 2018 06:29    TPro  4.8<L>  /  Alb  2.0<L>  /  TBili  8.4<H>  /  DBili  x   /  AST  522<H>  /  ALT  115<H>  /  AlkPhos  545<H>  05-29    PT/INR - ( 30 May 2018 06:30 )   PT: 22.7 SEC;   INR: 1.95          PTT - ( 30 May 2018 06:30 )  PTT:37.0 SEC          RADIOLOGY & ADDITIONAL TESTS:    Imaging Personally Reviewed:  < from: Xray Chest 1 View- PORTABLE-Urgent (05.29.18 @ 14:21) >    IMPRESSION:   Redemonstrated small left pleural effusion with likely associated passive   atelectasis. The lungs are otherwise clear.    Redemonstrated osseous metastases.    < end of copied text >    Consultant(s) Notes Reviewed:  Palliative, Hospice care, GI

## 2018-05-30 NOTE — PROGRESS NOTE ADULT - PROBLEM SELECTOR PLAN 5
- Previous imaging reviewed, pt with small bilateral pleural effusions and atelectasis likely contributing to SOB  - No role for thoracentesis at this time  - Repeat CXR reviewed, small effusions noted

## 2018-05-30 NOTE — CHART NOTE - NSCHARTNOTEFT_GEN_A_CORE
Hematology/Oncology Note    I came to the hospital  and spoke with both the patient and her daughter this morning. After I had discussed with the interventional radiology attending, Dr. Champion, about getting a liver biopsy to definitely find out what is going on in her liver, he said the procedure would be very risky and bleeding could very well be a complication. I spoke with Dr. Darling, the longitudinal heme/onc attending I am currently following the patient with,  and he said a biopsy definitely needs to be done in order to treat the patient. If a biopsy is done and it comes back as metastatic breast cancer to the liver, if her bilirubin is still 8, we could treat her with adriamycin 5mg/m2 weekly which I presented to the patient and her daughter as an option, explaining to them by her getting a liver biopsy , she is at risk for bleeding. If her biopsy were done and it comes back as hormone positive metastatic breast cancer, and we give her adriamycin reduced dose ( 5mg/m2 weekly) she could be at risk for myelosuppression, bleeding, infection and further liver damage. I also explained to her that even if we were to treat her even if her bilirubin was less than 5 and we had confirmed biopsy proven evidence that she has metastatic breast cancer to her liver that chemotherapy would likely not improve her quality of life, would not likely improve her overall survival and that her prognosis is poor. I agree with home hospice and discussed all of this with her and her daughter, Patricia, at bedside. They both do not want to pursue a liver biopsy and want to do home hospice. Appreciate palliative care input with regards to symptom management and appreciate GI input with regards to barium swallow study.     Kimberly Campos, DO  Hematology/Oncology Fellow Hematology/Oncology Note    I came to the hospital  and spoke with both the patient and her daughter this morning. After I had discussed with the interventional radiology attending, Dr. Champion, about getting a liver biopsy to definitely find out what is going on in her liver, he said the procedure would be very risky and bleeding could very well be a complication. I spoke with Dr. Darling, the longitudinal heme/onc attending I am currently following the patient with,  and he said a biopsy definitely needs to be done in order to treat the patient. If a biopsy is done and it comes back as metastatic breast cancer to the liver, if her bilirubin is still 8, we could treat her with adriamycin 5mg/m2 weekly which I presented to the patient and her daughter as an option, explaining to them by her getting a liver biopsy , she is at risk for bleeding. If her biopsy were done and it comes back as hormone positive metastatic breast cancer, and we give her adriamycin reduced dose ( 5mg/m2 weekly) she could be at risk for myelosuppression, bleeding, infection and further liver damage. I also explained to her that even if we were to treat her even if her bilirubin was less than 5 and we had confirmed biopsy proven evidence that she has metastatic breast cancer to her liver that chemotherapy would likely not improve her quality of life, would not likely improve her overall survival and that her prognosis is poor. I agree with home hospice and discussed all of this with her and her daughter, Particia, at bedside. They both do not want to pursue a liver biopsy, do not want chemotherapy and want to do home hospice. Appreciate palliative care input with regards to symptom management and appreciate GI input with regards to barium swallow study.    Kimberly Campos, DO  Hematology/Oncology Fellow Hematology/Oncology Note    I came to the hospital  and spoke with both the patient and her daughter this morning. After I had discussed with the interventional radiology attending, Dr. Champion, about getting a liver biopsy to definitely find out what is going on in her liver, he said the procedure would be very risky and bleeding could very well be a complication. I spoke with Dr. Darling, the longitudinal heme/onc attending I am currently following the patient with,  and he said a biopsy definitely needs to be done in order to treat the patient. If a biopsy is done and it comes back as metastatic breast cancer to the liver, if her bilirubin is still 8, we could treat her with adriamycin 5mg/m2 weekly which I presented to the patient and her daughter as an option, explaining to them by her getting a liver biopsy , she is at risk for bleeding. If her biopsy were done and it comes back as hormone positive metastatic breast cancer, and we give her adriamycin reduced dose ( 5mg/m2 weekly) she could be at risk for myelosuppression, bleeding, infection and further liver damage. I also explained to her that even if we were to treat her even if her bilirubin was less than 5 and we had confirmed biopsy proven evidence that she has metastatic breast cancer to her liver that chemotherapy would likely not improve her quality of life, and that her prognosis is poor. I agree with home hospice and discussed all of this with her and her daughter, Patricia, at bedside. They both do not want to pursue a liver biopsy, do not want chemotherapy and want to do home hospice. Appreciate palliative care input with regards to symptom management and appreciate GI input with regards to barium swallow study.    Kimberly Campos,   Hematology/Oncology Fellow

## 2018-05-30 NOTE — PROGRESS NOTE ADULT - SUBJECTIVE AND OBJECTIVE BOX
INTERVAL HPI/OVERNIGHT EVENTS:  Pt much more awake.  Denies pain..  Tolerating liquid and going to attempt full diet  Allergies    No Known Allergies    Intolerances    latex (Other (Skin))      Code Status:          PRESENT SYMPTOMS:   SOURCE:  [ x] Patient   [ ] Family   [ ] Team     Pain: none  Onset:      Location:          Duration:        Character:         Aggravating factors:          Relieving Factors:             Timing:         Severity:      Dyspnea [ ] YES [x ] NO - Mild [ ]  Moderate [ ]  Severe [ ]   Anxiety:  [ ] YES [x ] NO  Fatigue: [x ] YES [ ] NO  Nausea: [ ] YES [x ] NO  Loss of Appetite: [ ] YES [x ] NO  Constipation [ ] YES   [x ] No     OTHER SYMPTOMS:  [ x] All other ROS negative     [ ] Unable to obtain due to poor mentation    Does the patient meet criteria for Severe Protein Calorie Malnutrition?  Yes [ ]  No [ ]   PPSV less than <30% [ ]  Anasarca [ ]  Albumin <2 [ ]  Catabolic State [ ]  Poor nutritional intake [ ]  Significant weight loss [ ]     MEDICATIONS  (STANDING):  dexamethasone     Tablet 4 milliGRAM(s) Oral two times a day  dextrose 5%. 1000 milliLiter(s) (50 mL/Hr) IV Continuous <Continuous>  dextrose 50% Injectable 12.5 Gram(s) IV Push once  dextrose 50% Injectable 25 Gram(s) IV Push once  dextrose 50% Injectable 25 Gram(s) IV Push once  docusate sodium 100 milliGRAM(s) Oral three times a day  fluticasone propionate 50 MICROgram(s)/spray Nasal Spray 1 Spray(s) Both Nostrils two times a day  insulin lispro (HumaLOG) corrective regimen sliding scale   SubCutaneous three times a day before meals  insulin lispro (HumaLOG) corrective regimen sliding scale   SubCutaneous at bedtime  metoprolol tartrate 50 milliGRAM(s) Oral two times a day  senna 2 Tablet(s) Oral at bedtime    MEDICATIONS  (PRN):  dextrose 40% Gel 15 Gram(s) Oral once PRN Blood Glucose LESS THAN 70 milliGRAM(s)/deciliter  glucagon  Injectable 1 milliGRAM(s) IntraMuscular once PRN Glucose LESS THAN 70 milligrams/deciliter  metoclopramide Injectable 5 milliGRAM(s) IV Push every 8 hours PRN Nausea/ vomiting  morphine   Solution 2.5 milliGRAM(s) Oral every 4 hours PRN For moderate to severe pain  oxyCODONE    IR 5 milliGRAM(s) Oral every 4 hours PRN Moderate to Severe Pain      Palliative Performance Status Version 2:      50   %  ECOG -        Physical Exam:    General: [x ] Alert,  A&O x  3   [ ] lethargic   [ ] Agitated   [ ] Cachexia   HEENT: [x ] Normal   [ ] Dry mouth   [ ] ET Tube    [ ] Trach   Lungs: [x ] Clear [ ] Rhonchi  [ ] Crackles [ ] Wheezing [ ] Tachypnea  [ ] Audible excessive secretions   Cardiovascular:  [x ] Regular rate and rhythm  [ ] Irregular [ ] Tachycardia   [ ] Bradycardia   Abdomen: [x ] Soft  [x ] Distended  [ ]  [x ] +BS  [ ] Non tender [ ] Tender  [ ]PEG   [ ] NGT   Last BM:     Genitourinary:  [x ] Normal [ ] Incontinent   [ ] Oliguria/Anuria   [ ] Quintana  Musculoskeletal:  [ ] Normal   [x ] Generalized weakness  [ ] Bedbound  [ ] Edema   Neurological: x[ ] No focal deficits  [ ] Cognitive impairment     Skin: [ ] Normal   [ ] Pressure ulcers +jaundice    Vital Signs Last 24 Hrs  T(C): 36.2 (30 May 2018 12:58), Max: 36.7 (29 May 2018 20:40)  T(F): 97.2 (30 May 2018 12:58), Max: 98 (29 May 2018 20:40)  HR: 73 (30 May 2018 12:58) (70 - 80)  BP: 136/72 (30 May 2018 12:58) (116/60 - 136/72)  BP(mean): --  RR: 18 (30 May 2018 12:58) (17 - 18)  SpO2: 98% (30 May 2018 12:58) (96% - 99%)    LABS:                        10.7   7.67  )-----------( 80       ( 29 May 2018 19:00 )             33.2     05-29    130<L>  |  98  |  17  ----------------------------<  90  4.9   |  20<L>  |  0.85    Ca    9.3      29 May 2018 06:29    TPro  4.8<L>  /  Alb  2.0<L>  /  TBili  8.4<H>  /  DBili  x   /  AST  522<H>  /  ALT  115<H>  /  AlkPhos  545<H>  05-29    PT/INR - ( 30 May 2018 06:30 )   PT: 22.7 SEC;   INR: 1.95          PTT - ( 30 May 2018 06:30 )  PTT:37.0 SEC    I&O's Summary      RADIOLOGY & ADDITIONAL STUDIES:

## 2018-05-30 NOTE — PROGRESS NOTE ADULT - ASSESSMENT
Impression:  58yo F with DM, HTN, and breast ca (ER/DC + Oou1ntr -) on letrozole with bone and liver mets s/p RT to hip 2016 p/w nausea, vomiting and weakness  Found to have multiple liver lesions, likely metastatic disease, for which hepatology was consulted.    Problem List:  1) Diffuse liver lesions likely metastatic breast ca with abnormal liver tests likely 2/2 infiltrative disease.  Nodular morphology of liver on CT/hard smooth texture on exam is likely from metastatic lesions, less likely new cirrhosis  2) Metastastic breast ca to bones, liver as above  3) HTN  4) DM  5) PO intolerance    Plan:  - liver biopsy and further management per oncology and primary team  - if patient is continuing to have PO intolerance, would consider upper GI series to assess for outflow obstruction  - Discussed with advanced GI team and patient and no plan for biliary stenting at this time  - trend CMP, INR, CBC daily Impression:  56yo F with DM, HTN, and breast ca (ER/PA + [id number] -) on letrozole with bone and liver mets s/p RT to hip 2016 p/w nausea, vomiting and weakness  Found to have multiple liver lesions, likely metastatic disease, for which hepatology was consulted.    Problem List:  1) Diffuse liver lesions likely metastatic breast ca with abnormal liver tests likely 2/2 infiltrative disease.  Nodular morphology of liver on CT/hard smooth texture on exam is likely from metastatic lesions, less likely new cirrhosis  2) Metastastic breast ca to bones, liver as above  3) HTN  4) DM  5) PO intolerance    Plan:  - please obtain upper GI series to evaluate for outflow obstruction  - please start PPI daily for GERD  - liver biopsy and further management per oncology and primary team  - Discussed with advanced GI team and patient and no plan for biliary stenting at this time. May reconsider after results of upper GI series  - palliative care was consulted and plan for hospice  - trend CMP, INR, CBC daily

## 2018-05-31 LAB
APTT BLD: 38.3 SEC — HIGH (ref 27.5–37.4)
FIBRINOGEN PPP-MCNC: 273 MG/DL — LOW (ref 310–510)
HCT VFR BLD CALC: 34.4 % — LOW (ref 34.5–45)
HGB BLD-MCNC: 11.2 G/DL — LOW (ref 11.5–15.5)
INR BLD: 2.54 — HIGH (ref 0.88–1.17)
MCHC RBC-ENTMCNC: 30.8 PG — SIGNIFICANT CHANGE UP (ref 27–34)
MCHC RBC-ENTMCNC: 32.6 % — SIGNIFICANT CHANGE UP (ref 32–36)
MCV RBC AUTO: 94.5 FL — SIGNIFICANT CHANGE UP (ref 80–100)
NRBC # FLD: 0.36 — SIGNIFICANT CHANGE UP
NRBC FLD-RTO: 2.5 — SIGNIFICANT CHANGE UP
PLATELET # BLD AUTO: 100 K/UL — LOW (ref 150–400)
PMV BLD: 8.8 FL — SIGNIFICANT CHANGE UP (ref 7–13)
PROTHROM AB SERPL-ACNC: 28.7 SEC — HIGH (ref 9.8–13.1)
RBC # BLD: 3.64 M/UL — LOW (ref 3.8–5.2)
RBC # FLD: 19.4 % — HIGH (ref 10.3–14.5)
WBC # BLD: 14.61 K/UL — HIGH (ref 3.8–10.5)
WBC # FLD AUTO: 14.61 K/UL — HIGH (ref 3.8–10.5)

## 2018-05-31 PROCEDURE — 99232 SBSQ HOSP IP/OBS MODERATE 35: CPT | Mod: GC

## 2018-05-31 PROCEDURE — 99233 SBSQ HOSP IP/OBS HIGH 50: CPT

## 2018-05-31 RX ORDER — PANTOPRAZOLE SODIUM 20 MG/1
40 TABLET, DELAYED RELEASE ORAL
Qty: 0 | Refills: 0 | Status: DISCONTINUED | OUTPATIENT
Start: 2018-05-31 | End: 2018-06-01

## 2018-05-31 RX ORDER — OXYCODONE HYDROCHLORIDE 5 MG/1
5 TABLET ORAL EVERY 4 HOURS
Qty: 0 | Refills: 0 | Status: DISCONTINUED | OUTPATIENT
Start: 2018-05-31 | End: 2018-05-31

## 2018-05-31 RX ORDER — METOCLOPRAMIDE HCL 10 MG
5 TABLET ORAL EVERY 8 HOURS
Qty: 0 | Refills: 0 | Status: DISCONTINUED | OUTPATIENT
Start: 2018-05-31 | End: 2018-06-01

## 2018-05-31 RX ORDER — MORPHINE SULFATE 50 MG/1
2.5 CAPSULE, EXTENDED RELEASE ORAL EVERY 4 HOURS
Qty: 0 | Refills: 0 | Status: DISCONTINUED | OUTPATIENT
Start: 2018-05-31 | End: 2018-06-01

## 2018-05-31 RX ADMIN — PANTOPRAZOLE SODIUM 40 MILLIGRAM(S): 20 TABLET, DELAYED RELEASE ORAL at 17:20

## 2018-05-31 RX ADMIN — MORPHINE SULFATE 2.5 MILLIGRAM(S): 50 CAPSULE, EXTENDED RELEASE ORAL at 04:00

## 2018-05-31 RX ADMIN — Medication 50 MILLIGRAM(S): at 17:15

## 2018-05-31 RX ADMIN — Medication 4 MILLIGRAM(S): at 17:14

## 2018-05-31 RX ADMIN — Medication 50 MILLIGRAM(S): at 05:10

## 2018-05-31 RX ADMIN — MORPHINE SULFATE 2.5 MILLIGRAM(S): 50 CAPSULE, EXTENDED RELEASE ORAL at 22:10

## 2018-05-31 RX ADMIN — Medication 100 MILLIGRAM(S): at 12:46

## 2018-05-31 RX ADMIN — Medication 1 SPRAY(S): at 17:15

## 2018-05-31 RX ADMIN — Medication 1 SPRAY(S): at 05:10

## 2018-05-31 RX ADMIN — MORPHINE SULFATE 2.5 MILLIGRAM(S): 50 CAPSULE, EXTENDED RELEASE ORAL at 21:10

## 2018-05-31 RX ADMIN — MORPHINE SULFATE 2.5 MILLIGRAM(S): 50 CAPSULE, EXTENDED RELEASE ORAL at 03:44

## 2018-05-31 RX ADMIN — Medication 2: at 12:44

## 2018-05-31 RX ADMIN — Medication 4 MILLIGRAM(S): at 05:10

## 2018-05-31 RX ADMIN — Medication 1: at 17:13

## 2018-05-31 RX ADMIN — Medication 100 MILLIGRAM(S): at 05:10

## 2018-05-31 RX ADMIN — Medication 5 MILLIGRAM(S): at 09:24

## 2018-05-31 RX ADMIN — Medication 100 MILLIGRAM(S): at 21:10

## 2018-05-31 NOTE — DISCHARGE NOTE ADULT - PATIENT PORTAL LINK FT
You can access the [facility] Patient Portal, offered by [facility], by registering with the following website: [email or web address]

## 2018-05-31 NOTE — DISCHARGE NOTE ADULT - SECONDARY DIAGNOSIS.
Cirrhosis Essential hypertension Palliative care encounter Type 2 diabetes mellitus without complication, without long-term current use of insulin

## 2018-05-31 NOTE — DISCHARGE NOTE ADULT - CARE PLAN
Principal Discharge DX:	Liver metastases  Secondary Diagnosis:	Cirrhosis  Secondary Diagnosis:	Essential hypertension  Secondary Diagnosis:	Palliative care encounter  Secondary Diagnosis:	Type 2 diabetes mellitus without complication, without long-term current use of insulin Principal Discharge DX:	Liver metastases  Goal:	remain comfortable  Assessment and plan of treatment:	- Risk of liver biopsy outweighs the benefit at this point and given that pt is not a candidate for chemotherapy, liver biopsy cancelled.   - Continue with Reglan PRN and addition of decadron may assist with nausea.   - Follow up with Dr. Andres Quijano as outpatient  - Follow up with home Hospice services  Secondary Diagnosis:	Cirrhosis  Assessment and plan of treatment:	- you are not a candidate for systemic chemotherapy at this time  - Continue low dose Morphine solution PRN in addition to Decadron 4mg BID.   - Pt prefers using NSAIDs, but explained that NSAIDs should be avoided given liver disease  - Follow up with Dr. Andres Quijano as outpatient  - Follow up with home Hospice services  Secondary Diagnosis:	Essential hypertension  Assessment and plan of treatment:	- Follow up with Dr. Andres Quijano as outpatient  - Follow up with home Hospice services  Secondary Diagnosis:	Palliative care encounter  Assessment and plan of treatment:	- Follow up with Dr. Andres Quijano as outpatient  - Follow up with home Hospice services  Secondary Diagnosis:	Type 2 diabetes mellitus without complication, without long-term current use of insulin  Assessment and plan of treatment:	- continue Carb diet  - hold off Metformin  - Follow up with Dr. Andres Quijano as outpatient  - Follow up with home Hospice services

## 2018-05-31 NOTE — PROGRESS NOTE ADULT - SUBJECTIVE AND OBJECTIVE BOX
Patient is a 57y old  Female who presents with a chief complaint of Elevated liver enzymes, weakness, vomiting (31 May 2018 07:35)      SUBJECTIVE / OVERNIGHT EVENTS: Pt feels the same; has been able to tolerate her regular diet.  Pain mainly localized to backside, but refusing PO Oxycodone.  Finds most relief with PO morphine solution.  Pt agreeable to d/c home with hospice today.       MEDICATIONS  (STANDING):  dexamethasone     Tablet 4 milliGRAM(s) Oral two times a day  dextrose 5%. 1000 milliLiter(s) (50 mL/Hr) IV Continuous <Continuous>  dextrose 50% Injectable 12.5 Gram(s) IV Push once  dextrose 50% Injectable 25 Gram(s) IV Push once  dextrose 50% Injectable 25 Gram(s) IV Push once  docusate sodium 100 milliGRAM(s) Oral three times a day  fluticasone propionate 50 MICROgram(s)/spray Nasal Spray 1 Spray(s) Both Nostrils two times a day  insulin lispro (HumaLOG) corrective regimen sliding scale   SubCutaneous three times a day before meals  insulin lispro (HumaLOG) corrective regimen sliding scale   SubCutaneous at bedtime  metoprolol tartrate 50 milliGRAM(s) Oral two times a day  pantoprazole    Tablet 40 milliGRAM(s) Oral two times a day  senna 2 Tablet(s) Oral at bedtime    MEDICATIONS  (PRN):  dextrose 40% Gel 15 Gram(s) Oral once PRN Blood Glucose LESS THAN 70 milliGRAM(s)/deciliter  glucagon  Injectable 1 milliGRAM(s) IntraMuscular once PRN Glucose LESS THAN 70 milligrams/deciliter  metoclopramide 5 milliGRAM(s) Oral every 8 hours PRN Nausea/ vomiting  metoclopramide Injectable 5 milliGRAM(s) IV Push every 8 hours PRN Nausea/ vomiting  morphine   Solution 2.5 milliGRAM(s) Oral every 4 hours PRN For moderate to severe pain      Vital Signs Last 24 Hrs  T(C): 36.4 (31 May 2018 05:08), Max: 36.7 (30 May 2018 20:54)  T(F): 97.6 (31 May 2018 05:08), Max: 98.1 (30 May 2018 20:54)  HR: 75 (31 May 2018 05:08) (73 - 81)  BP: 124/62 (31 May 2018 05:08) (124/62 - 136/72)  BP(mean): --  RR: 17 (31 May 2018 05:08) (17 - 18)  SpO2: 95% (31 May 2018 05:08) (95% - 98%)  CAPILLARY BLOOD GLUCOSE      POCT Blood Glucose.: 144 mg/dL (31 May 2018 07:33)  POCT Blood Glucose.: 131 mg/dL (30 May 2018 21:17)  POCT Blood Glucose.: 141 mg/dL (30 May 2018 17:04)  POCT Blood Glucose.: 134 mg/dL (30 May 2018 12:19)    I&O's Summary      PHYSICAL EXAM  GENERAL: NAD, well-developed  HEAD:  Atraumatic, Normocephalic  EYES: EOMI, PERRLA, scleral icterus   NECK: Supple, No JVD  CHEST/LUNG: Decreased breath sounds in b/l bases   HEART: Regular rate and rhythm; No murmurs, rubs, or gallops  ABDOMEN: Soft, +TTP diffusely   PSYCH: AAOx3  SKIN: jaundiced    LABS:                        11.2   14.61 )-----------( 100      ( 31 May 2018 06:30 )             34.4     PT/INR - ( 31 May 2018 06:30 )   PT: 28.7 SEC;   INR: 2.54          PTT - ( 31 May 2018 06:30 )  PTT:38.3 SEC          RADIOLOGY & ADDITIONAL TESTS:    Imaging Personally Reviewed:  < from: Xray Upper GI Series (05.30.18 @ 09:16) >  IMPRESSION:   Small hiatal hernia.  GERD.  No evidence of outflow obstruction.        < end of copied text >    Consultant(s) Notes Reviewed:  GI, Onc, Palliative Care

## 2018-05-31 NOTE — CHART NOTE - NSCHARTNOTEFT_GEN_A_CORE
As per pt., doesn't want to use Oxycodone. As per pt., pain is controlled if in correct position and helped with morphine. Will D/C Oxycodone.     ADS  [id number]

## 2018-05-31 NOTE — DISCHARGE NOTE ADULT - PLAN OF CARE
- you are not a candidate for systemic chemotherapy at this time  - Continue low dose Morphine solution PRN in addition to Decadron 4mg BID.   - Pt prefers using NSAIDs, but explained that NSAIDs should be avoided given liver disease  - Follow up with Dr. Campos Onc as outpatient  - Follow up with home Hospice services - Follow up with Dr. Andres Quijano as outpatient  - Follow up with home Hospice services - continue Carb diet  - hold off Metformin  - Follow up with Dr. Campos Onc as outpatient  - Follow up with home Hospice services remain comfortable - Risk of liver biopsy outweighs the benefit at this point and given that pt is not a candidate for chemotherapy, liver biopsy cancelled.   - Continue with Reglan PRN and addition of decadron may assist with nausea.   - Follow up with Dr. Campos Onc as outpatient  - Follow up with home Hospice services

## 2018-05-31 NOTE — DISCHARGE NOTE ADULT - MEDICATION SUMMARY - MEDICATIONS TO TAKE
I will START or STAY ON the medications listed below when I get home from the hospital:    dexamethasone 4 mg oral tablet  -- 1 tab(s) by mouth 2 times a day  -- Indication: For Liver metastases    morphine 10 mg/5 mL oral solution  -- 1.25 milliliter(s) by mouth every 4 hours, As needed, For moderate to severe pain MDD:7.5ml /day  -- Indication: For Liver metastases    metoclopramide 5 mg oral tablet  -- 1 tab(s) by mouth every 8 hours, As needed, Nausea/ vomiting  -- Indication: For Nausea / vomiting    metoprolol tartrate 50 mg oral tablet  -- 1 tab(s) by mouth 2 times a day  -- Indication: For Essential hypertension    senna oral tablet  -- 2 tab(s) by mouth once a day (at bedtime)  -- Indication: For Constipation    docusate sodium 100 mg oral capsule  -- 1 cap(s) by mouth 3 times a day  -- Indication: For Stool softner    fluticasone 50 mcg/inh nasal spray  -- 1 spray(s) into nose 2 times a day  -- Indication: For Nasal spray    pantoprazole 40 mg oral delayed release tablet  -- 1 tab(s) by mouth 2 times a day  -- Indication: For GERD

## 2018-05-31 NOTE — DISCHARGE NOTE ADULT - HOSPITAL COURSE
57 F DM, HTN, and breast Ca (ER/NV + [id number] -) on Letrozole with bone and liver mets S/P RT to hip 2016 sent to the ED by Oncology for N/V and weakness with elevated LFT's elevated INR, and imaging showing new liver mets and possible cirrhosis on CT.    Liver metastases  -MRCP 5/27 - cirrhosis vs. pseudocirrhosis, innumerable metastatic lesions in liver; NORMAL BILIARY TREE   -Onc Cx - NO BIOPSY indicated, poor prognosis  -GI Cx - MRCP, upper GI series   -Upper GI series - R/O outflow obstruction - no obstruction, small hiatal hernia, GERD   -Palliative Cx - Decadron BID (started 5/29), Oxycodone PRN     Nausea/vomiting  -Zofran d'martinez and changed to Reglan 5/30    Lactic acid acidosis  -Likely 2/2 dehydration, lower suspicion of Metformin as a cause  -IVF   -Downtrending     Metastatic breast cancer  -As above     Hypercalcemia of malignancy  -Likely 2/2 malignancy    -5/25 - S/P Pamidronate x 1, calcitonin x 2   -PTH 7.78, PTHrP -------------------------    Essential hypertension  -BB    Type 2 diabetes mellitus without complication  -A1C 5.4     Discharge to home with hospice 57 F DM, HTN, and breast Ca (ER/[place] + Zri2Lps -) on Letrozole with bone and liver mets S/P RT to hip 2016 sent to the ED by Oncology for N/V and weakness with elevated LFT's elevated INR, and imaging showing new liver mets and possible cirrhosis on CT.    Liver metastases-MRCP 5/27 - cirrhosis vs. pseudocirrhosis, innumerable metastatic lesions in liver; NORMAL BILIARY TREE. Onc Cx - NO BIOPSY indicated, poor prognosis. GI Cx - MRCP, upper GI series. Upper GI series - R/O outflow obstruction - no obstruction, small hiatal hernia, GERD. Palliative Cx - Decadron BID (started 5/29), Oxycodone PRN     Nausea/vomiting- Zofran d'martinez and changed to Reglan 5/30    Lactic acid acidosis- Likely 2/2 dehydration, lower suspicion of Metformin as a cause, IVF, Downtrending     Metastatic breast cancer-As above     Hypercalcemia of malignancy -Likely 2/2 malignancy, 5/25 - S/P Pamidronate x 1, calcitonin x 2     Essential hypertension-BB    Type 2 diabetes mellitus without complication -A1C 5.4%. Hold off Metformin    Discharge to home with hospice 57 F DM, HTN, and breast Ca (ER/[place] + [id number] -) on Letrozole with bone and liver mets S/P RT to hip 2016 sent to the ED by Oncology for N/V and weakness with elevated LFT's elevated INR, and imaging showing diffuse liver mets. Oncology met with patient and given rising bilirubin, pt was not a candidate for systemic chemotherapy and recommended hospice care.  Pt was medically stable for d/c home with home hospice.

## 2018-05-31 NOTE — PROGRESS NOTE ADULT - PROBLEM SELECTOR PLAN 1
- Risk of liver biopsy outweighs the benefit at this point and given that pt is not a candidate for chemotherapy, liver biopsy cancelled (IR informed).  Reviewed Dr. Campos (primary oncologist) note from yesterday.   - MRCP reviewed, no obstructive process for stenting.  Liver with numerable mets that is likely causing her acute liver injury.

## 2018-05-31 NOTE — CHART NOTE - NSCHARTNOTEFT_GEN_A_CORE
Patient with metastatic breast cancer admitted with visceral crises.  [facility] hospice. Palliative on board.  Oncology will sign off. Please call us back with questions.

## 2018-05-31 NOTE — PROGRESS NOTE ADULT - ASSESSMENT
Impression:  58yo F with DM, HTN, and breast ca (ER/NH + [id number] -) on letrozole with bone and liver mets s/p RT to hip 2016 p/w nausea, vomiting and weakness  Found to have multiple liver lesions, likely metastatic disease, for which hepatology was consulted.    Problem List:  1) Diffuse liver lesions likely metastatic breast ca with abnormal liver tests likely 2/2 infiltrative disease.  Nodular morphology of liver on CT/hard smooth texture on exam is likely from metastatic lesions, less likely new cirrhosis  2) Metastatic breast ca to bones, liver as above  3) HTN  4) DM  5) PO intolerance    Plan:  - upper GI series without pathology  - recommend PPI PO BID 30 min before breakfast and dnner  - add PRN Zantac/Pepcid BID for breakthrough symptoms, to be taken 30 min before meals  - if above does not help, can also try liquid sucralfate before meals to coat gastric mucosa  - liver biopsy/oncology plans per oncology/primary/hospice Impression:  56yo F with DM, HTN, and breast ca (ER/TN + [id number] -) on letrozole with bone and liver mets s/p RT to hip 2016 p/w nausea, vomiting and weakness  Found to have multiple liver lesions, likely metastatic disease, for which hepatology was consulted.    Problem List:  1) Diffuse liver lesions likely metastatic breast ca with abnormal liver tests likely 2/2 infiltrative disease.  Nodular morphology of liver on CT/hard smooth texture on exam is likely from metastatic lesions, less likely new cirrhosis  2) Metastatic breast ca to bones, liver as above  3) HTN  4) DM  5) PO intolerance    Plan:  - upper GI series without pathology  - recommend PPI PO BID 30 min before breakfast and dnner  - if above does not help, can also try liquid sucralfate before meals to coat gastric mucosa  - liver biopsy/oncology plans per oncology/primary/hospice

## 2018-05-31 NOTE — PROGRESS NOTE ADULT - SUBJECTIVE AND OBJECTIVE BOX
HEPATOLOGY PROGRESS NOTE      Chief Complaint:  Patient is a 57y old  Female who presents with a chief complaint of Elevated liver enzymes, weakness, vomiting (31 May 2018 07:35)      Interval Events:  No events overnight.  Appears patient/oncology decided on hospice.  GI follow through XR negative.     Allergies:  latex (Other (Skin))  No Known Allergies      Hospital Medications:  dexamethasone     Tablet 4 milliGRAM(s) Oral two times a day  dextrose 40% Gel 15 Gram(s) Oral once PRN  dextrose 5%. 1000 milliLiter(s) IV Continuous <Continuous>  dextrose 50% Injectable 12.5 Gram(s) IV Push once  dextrose 50% Injectable 25 Gram(s) IV Push once  dextrose 50% Injectable 25 Gram(s) IV Push once  docusate sodium 100 milliGRAM(s) Oral three times a day  fluticasone propionate 50 MICROgram(s)/spray Nasal Spray 1 Spray(s) Both Nostrils two times a day  glucagon  Injectable 1 milliGRAM(s) IntraMuscular once PRN  insulin lispro (HumaLOG) corrective regimen sliding scale   SubCutaneous three times a day before meals  insulin lispro (HumaLOG) corrective regimen sliding scale   SubCutaneous at bedtime  metoclopramide Injectable 5 milliGRAM(s) IV Push every 8 hours PRN  metoprolol tartrate 50 milliGRAM(s) Oral two times a day  morphine   Solution 2.5 milliGRAM(s) Oral every 4 hours PRN  oxyCODONE    IR 5 milliGRAM(s) Oral every 4 hours PRN  senna 2 Tablet(s) Oral at bedtime      PMHX/PSHX:  Essential hypertension  Diabetes  Breast cancer  No significant past surgical history      Family history:  No pertinent family history in first degree relatives      ROS:     General:  No wt loss, fevers, chills, night sweats, fatigue,   Eyes:  Good vision, no reported pain  ENT:  No sore throat, pain, runny nose, dysphagia  CV:  No pain, palpitations, hypo/hypertension  Resp:  No dyspnea, cough, tachypnea, wheezing  GI:  See HPI  :  No pain, bleeding, incontinence, nocturia  Muscle:  No pain, weakness  Neuro:  No weakness, tingling, memory problems  Psych:  No fatigue, insomnia, mood problems, depression  Endocrine:  No polyuria, polydipsia, cold/heat intolerance  Heme:  No petechiae, ecchymosis, easy bruisability  Skin:  No rash, edema      PHYSICAL EXAM:     GENERAL:  Appears stated age, well-groomed, well-nourished, no distress  HEENT:  NC/AT  CHEST:  Full & symmetric excursion, no increased effort, breath sounds clear  HEART:  Regular rhythm, S1, S2, no murmur/rub/S3/S4,  no edema  ABDOMEN:  Soft, non-tender, non-distended, normoactive bowel sounds,  no masses ,no hepato-splenomegaly,   EXTREMITIES:  no cyanosis,clubbing or edema  SKIN:  +jaundice  NEURO:  Alert, oriented    Vital Signs:  Vital Signs Last 24 Hrs  T(C): 36.4 (31 May 2018 05:08), Max: 36.7 (30 May 2018 20:54)  T(F): 97.6 (31 May 2018 05:08), Max: 98.1 (30 May 2018 20:54)  HR: 75 (31 May 2018 05:08) (73 - 81)  BP: 124/62 (31 May 2018 05:08) (124/62 - 136/72)  BP(mean): --  RR: 17 (31 May 2018 05:08) (17 - 18)  SpO2: 95% (31 May 2018 05:08) (95% - 98%)  Daily     Daily     LABS:                        11.2   14.61 )-----------( 100      ( 31 May 2018 06:30 )             34.4             PT/INR - ( 31 May 2018 06:30 )   PT: 28.7 SEC;   INR: 2.54          PTT - ( 31 May 2018 06:30 )  PTT:38.3 SEC        Imaging:    < from: Xray Upper GI Series (05.30.18 @ 09:16) >  EXAM:  RAD UPPER GI        PROCEDURE DATE:  May 30 2018         INTERPRETATION:  CLINICAL INFORMATION: Nausea and vomiting.    TECHNIQUE: An upper GI series was performed utilizing barium as the   contrast agent.    COMPARISON: No similar prior studies available for comparison.    FLUORO TIME:     FINDINGS:  Preliminary  radiograph of the abdomen demonstrates an unremarkable   bowel gas pattern.  Generalized sclerotic metastases is apparent.    The esophagus is structurally normal without intraluminal filling defects   or extrinsic compression. The esophageal mucosa appears normal.     Contrast passed unimpeded through the gastroesophageal junction into the   stomach. The stomach demonstrates normal distensibility. No mass or   ulceration is identified. There is a small hiatal hernia.   Gastroesophageal reflux was demonstrated.     No gastric outlet obstruction was appreciated. The duodenal bulb and   sweep are unremarkable. No duodenal ulcers are demonstrated.     IMPRESSION:   Small hiatal hernia.  GERD.  No evidence of outflow obstruction.              ERNESTO PERALTA M.D., RADIOLOGY RESIDENT  This document has been electronically signed.  ARTHUR MEDINA M.D.; ATTENDING RADIOLOGIST  This document has been electronically signed. May 30 2018  3:37PM        < end of copied text >

## 2018-06-01 VITALS
OXYGEN SATURATION: 97 % | HEART RATE: 72 BPM | SYSTOLIC BLOOD PRESSURE: 121 MMHG | DIASTOLIC BLOOD PRESSURE: 67 MMHG | TEMPERATURE: 97 F | RESPIRATION RATE: 18 BRPM

## 2018-06-01 LAB — PTH RELATED PROT SERPL-MCNC: 5.9 — HIGH

## 2018-06-01 PROCEDURE — 99239 HOSP IP/OBS DSCHRG MGMT >30: CPT

## 2018-06-01 RX ORDER — DEXAMETHASONE 0.5 MG/5ML
1 ELIXIR ORAL
Qty: 60 | Refills: 0 | OUTPATIENT
Start: 2018-06-01 | End: 2018-06-30

## 2018-06-01 RX ORDER — METFORMIN HYDROCHLORIDE 850 MG/1
0 TABLET ORAL
Qty: 0 | Refills: 0 | COMMUNITY

## 2018-06-01 RX ORDER — METOCLOPRAMIDE HCL 10 MG
1 TABLET ORAL
Qty: 45 | Refills: 0 | OUTPATIENT
Start: 2018-06-01 | End: 2018-06-15

## 2018-06-01 RX ORDER — LETROZOLE 2.5 MG/1
1 TABLET, FILM COATED ORAL
Qty: 0 | Refills: 0 | COMMUNITY

## 2018-06-01 RX ORDER — SENNA PLUS 8.6 MG/1
2 TABLET ORAL
Qty: 60 | Refills: 0 | OUTPATIENT
Start: 2018-06-01 | End: 2018-06-30

## 2018-06-01 RX ORDER — PANTOPRAZOLE SODIUM 20 MG/1
1 TABLET, DELAYED RELEASE ORAL
Qty: 60 | Refills: 0 | OUTPATIENT
Start: 2018-06-01 | End: 2018-06-30

## 2018-06-01 RX ORDER — METOPROLOL TARTRATE 50 MG
1 TABLET ORAL
Qty: 0 | Refills: 0 | COMMUNITY

## 2018-06-01 RX ORDER — DOCUSATE SODIUM 100 MG
1 CAPSULE ORAL
Qty: 90 | Refills: 0 | OUTPATIENT
Start: 2018-06-01 | End: 2018-06-30

## 2018-06-01 RX ORDER — FLUTICASONE PROPIONATE 50 MCG
1 SPRAY, SUSPENSION NASAL
Qty: 0 | Refills: 0 | COMMUNITY
Start: 2018-06-01

## 2018-06-01 RX ORDER — MORPHINE SULFATE 50 MG/1
1.25 CAPSULE, EXTENDED RELEASE ORAL
Qty: 225 | Refills: 0 | OUTPATIENT
Start: 2018-06-01 | End: 2018-06-30

## 2018-06-01 RX ORDER — METOPROLOL TARTRATE 50 MG
1 TABLET ORAL
Qty: 0 | Refills: 0 | COMMUNITY
Start: 2018-06-01

## 2018-06-01 RX ADMIN — Medication 100 MILLIGRAM(S): at 12:29

## 2018-06-01 RX ADMIN — Medication 100 MILLIGRAM(S): at 05:09

## 2018-06-01 RX ADMIN — PANTOPRAZOLE SODIUM 40 MILLIGRAM(S): 20 TABLET, DELAYED RELEASE ORAL at 05:09

## 2018-06-01 RX ADMIN — Medication 1: at 12:29

## 2018-06-01 RX ADMIN — Medication 50 MILLIGRAM(S): at 05:09

## 2018-06-01 RX ADMIN — Medication 4 MILLIGRAM(S): at 05:09

## 2018-06-01 NOTE — PROGRESS NOTE ADULT - PROBLEM SELECTOR PROBLEM 1
Liver metastases
Malignant neoplasm of female breast, unspecified estrogen receptor status, unspecified laterality, unspecified site of breast
Metastatic breast cancer
Metastatic breast cancer
Liver metastases

## 2018-06-01 NOTE — PROGRESS NOTE ADULT - ATTENDING COMMENTS
MRI reviewed with radiology. Agree with assessment and plan as above.
Worsening LFT given history of metastatic breast cancer with extensive liver metastasis, MRCP did not suggest biliary dilatation. She is a candidate for hospice care.
agree with above, pending MRI to determine if there are any stentable/reversible approaches possible to improve liver issues.
- 32 minutes spent coordinating discharge, including discussion of home hospice and medications to go home with.
- 32 minutes were spent coordinating discharge, including discussion of outpt meds with patient.

## 2018-06-01 NOTE — PROGRESS NOTE ADULT - PROBLEM SELECTOR PLAN 8
- hold metformin   - Humalog sliding scale

## 2018-06-01 NOTE — PROGRESS NOTE ADULT - PROBLEM SELECTOR PLAN 3
- worsening, likely related to diffuse hepatic metastatic disease  - f/u MRI liver to r/o obstruction process  - GI reccs appreciated
- Onc note reviewed   - Awaiting IR decision on liver biopsy to assess receptor status.  Oncology planning to initiate systemic chemotherapy this admission, however rising transaminases may be limiting factor
- Likely from disseminated malignancy, but pt going for upper GI series today to assess for obstruction (less likely as pt passing gas and having BMs)  - Continue with Zofran PRN and addition of decadron may assist with nausea
- Likely from disseminated malignancy, pt had upper GI series done today, awaiting results   - Continue with Reglan PRN and addition of decadron may assist with nausea
- Likely from disseminated malignancy, upper GI series reviewed, no indication for intervention at this time.   - Continue with Reglan PRN and addition of decadron may assist with nausea
-Secondary to metastatic disease.   -Symptoms control.   -Appreciate palliative/ medicine/ GI input.    Please page at [phone] with questions or concerns.
encourage oobtc  family to be assisting at home
- Likely from disseminated malignancy, upper GI series reviewed, no indication for intervention at this time.   - Continue with Reglan PRN and addition of decadron may assist with nausea

## 2018-06-01 NOTE — PROGRESS NOTE ADULT - PROBLEM SELECTOR PROBLEM 3
Liver dysfunction
Metastatic breast cancer
Debility
Nausea
Physical deconditioning
Nausea

## 2018-06-01 NOTE — PROGRESS NOTE ADULT - PROVIDER SPECIALTY LIST ADULT
Gastroenterology
Gastroenterology
Heme/Onc
Hospitalist
Gastroenterology
Hospitalist
Heme/Onc
Palliative Care
Hospitalist
Hospitalist

## 2018-06-01 NOTE — PROGRESS NOTE ADULT - PROBLEM SELECTOR PLAN 2
- likely related to osseous metastatic disease  - improved with pamidronate and calcitonin
- Likely 2/2 dehydration, lower suspicion of metformin as a cause  - Continue IVF hydration, lactate downtrending
- Pt is not a candidate for systemic chemotherapy and this was clearly discussed with her and son in law this morning  - Pt is candidate for home hospice and palliative will see pt today for symptom control  - Will start low dose Morphine solution PRN in addition to Decadron 4mg BID.   - Pt prefers using NSAIDs, but explained to her that NSAIDs should be avoided given her coagulopathy
- Pt is not a candidate for systemic chemotherapy and this was clearly discussed with pt and family   - [facility] met with pt and daughter today, agreeable to home hospice.   - Continue low dose Morphine solution PRN in addition to Decadron 4mg BID.   - Pt prefers using NSAIDs, but explained to her that NSAIDs should be avoided given her coagulopathy
- Pt is not a candidate for systemic chemotherapy and this was clearly discussed with pt and family   - Plan for d/c home with hospice today.   - Continue low dose Morphine solution PRN in addition to Decadron 4mg BID.   - Pt prefers using NSAIDs, but explained to her that NSAIDs should be avoided given her coagulopathy
-As above. No reversible cause identified in MRCP.
continue decadron with oxy ir prn
- Pt is not a candidate for systemic chemotherapy and this was clearly discussed with pt and family   - Plan for d/c home with hospice today.   - Continue low dose Morphine solution PRN in addition to Decadron 4mg BID.   - Pt prefers using NSAIDs, but explained to her that NSAIDs should be avoided given her coagulopathy

## 2018-06-01 NOTE — PROGRESS NOTE ADULT - PROBLEM SELECTOR PLAN 9
- SCDs given coagulopathy
- SCDs given coagulopathy
Will keep on SCD's for now given elevated INR and anticipation of liver met biopsy   No daily labs   Await GI series results, ultimate plan for home hospice
Will keep on SCD's for now given elevated INR and anticipation of liver met biopsy     IMPROVE VTE Individual Risk Assessment          RISK                                                          Points  [  ] Previous VTE                                                3  [  ] Thrombophilia                                             2  [  ] Lower limb paralysis                                   2        (unable to hold up >15 seconds)    [ x ] Current Cancer                                             2         (within 6 months)  [  ] Immobilization > 24 hrs                              1  [  ] ICU/CCU stay > 24 hours                             1  [  ] Age > 60                                                         1    IMPROVE VTE Score: 2

## 2018-06-01 NOTE — PROGRESS NOTE ADULT - ASSESSMENT
56 y/o F with stage IV breast ca with mets to bone, HTN, and DM p/w weakness and n/v with recent imaging showing new hepatic mets and acute liver injury likely from infiltrative malignant process.

## 2018-06-01 NOTE — PROGRESS NOTE ADULT - PROBLEM SELECTOR PROBLEM 2
Lactic acid acidosis
Liver metastases
Metastatic breast cancer
Right upper quadrant abdominal pain
Hypercalcemia of malignancy
Metastatic breast cancer

## 2018-06-01 NOTE — PROGRESS NOTE ADULT - SUBJECTIVE AND OBJECTIVE BOX
Patient is a 57y old  Female who presents with a chief complaint of Elevated liver enzymes, weakness, vomiting (31 May 2018 07:35)      SUBJECTIVE / OVERNIGHT EVENTS: Pt without new complaints.  Agreeable to having transportation set up for home today.       MEDICATIONS  (STANDING):  dexamethasone     Tablet 4 milliGRAM(s) Oral two times a day  dextrose 5%. 1000 milliLiter(s) (50 mL/Hr) IV Continuous <Continuous>  dextrose 50% Injectable 12.5 Gram(s) IV Push once  dextrose 50% Injectable 25 Gram(s) IV Push once  dextrose 50% Injectable 25 Gram(s) IV Push once  docusate sodium 100 milliGRAM(s) Oral three times a day  fluticasone propionate 50 MICROgram(s)/spray Nasal Spray 1 Spray(s) Both Nostrils two times a day  insulin lispro (HumaLOG) corrective regimen sliding scale   SubCutaneous three times a day before meals  insulin lispro (HumaLOG) corrective regimen sliding scale   SubCutaneous at bedtime  metoprolol tartrate 50 milliGRAM(s) Oral two times a day  pantoprazole    Tablet 40 milliGRAM(s) Oral two times a day  senna 2 Tablet(s) Oral at bedtime    MEDICATIONS  (PRN):  dextrose 40% Gel 15 Gram(s) Oral once PRN Blood Glucose LESS THAN 70 milliGRAM(s)/deciliter  glucagon  Injectable 1 milliGRAM(s) IntraMuscular once PRN Glucose LESS THAN 70 milligrams/deciliter  metoclopramide 5 milliGRAM(s) Oral every 8 hours PRN Nausea/ vomiting  metoclopramide Injectable 5 milliGRAM(s) IV Push every 8 hours PRN Nausea/ vomiting  morphine   Solution 2.5 milliGRAM(s) Oral every 4 hours PRN For moderate to severe pain      Vital Signs Last 24 Hrs  T(C): 36.3 (01 Jun 2018 12:22), Max: 36.6 (31 May 2018 20:37)  T(F): 97.4 (01 Jun 2018 12:22), Max: 97.8 (31 May 2018 20:37)  HR: 72 (01 Jun 2018 12:22) (72 - 80)  BP: 121/67 (01 Jun 2018 12:22) (121/67 - 150/64)  BP(mean): --  RR: 18 (01 Jun 2018 12:22) (17 - 18)  SpO2: 97% (01 Jun 2018 12:22) (94% - 97%)  CAPILLARY BLOOD GLUCOSE      POCT Blood Glucose.: 151 mg/dL (01 Jun 2018 12:12)  POCT Blood Glucose.: 146 mg/dL (01 Jun 2018 07:39)  POCT Blood Glucose.: 167 mg/dL (31 May 2018 21:22)  POCT Blood Glucose.: 160 mg/dL (31 May 2018 17:00)    I&O's Summary        PHYSICAL EXAM  PE deferred today    LABS:                        11.2   14.61 )-----------( 100      ( 31 May 2018 06:30 )             34.4           PT/INR - ( 31 May 2018 06:30 )   PT: 28.7 SEC;   INR: 2.54          PTT - ( 31 May 2018 06:30 )  PTT:38.3 SEC

## 2018-08-20 NOTE — ED PROVIDER NOTE - RESPIRATORY [+], MLM
Refill line call received today 2018 at 3:07PM   Dr. Garcia   Patients mother called for refill   : 2    [facility]: [facility]    Patients next appointment with Dr. Garcia is on 2018   EXERTIONAL DYSPNEA/chronic DEGROOT x several months

## 2019-10-27 NOTE — ED ADULT NURSE NOTE - NS PRO PASSIVE SMOKE EXP
Patient presents to ED with c/o pregnancy test. States that she took pregnancy test at home and it was positive but does not believe it to be true.
Unknown

## 2020-08-19 NOTE — PROGRESS NOTE ADULT - PROBLEM/PLAN-6
[Patient] : patient
DISPLAY PLAN FREE TEXT

## 2022-06-20 NOTE — GOALS OF CARE CONVERSATION - PERSONAL ADVANCE DIRECTIVE - CONVERSATION DETAILS
[facility] - Pt discharged to home 6[facility]1
Met with daughter and patient.  Explained services.  At end of meeting, oncologist came to see patient.  Consents left at the bedside and contact information provided to notify when consents are signed.  Patient plans to move forward with Hospice [facility] and will need equipment delivered prior to DC.
 used
